# Patient Record
Sex: MALE | Race: WHITE | NOT HISPANIC OR LATINO | Employment: FULL TIME | URBAN - METROPOLITAN AREA
[De-identification: names, ages, dates, MRNs, and addresses within clinical notes are randomized per-mention and may not be internally consistent; named-entity substitution may affect disease eponyms.]

---

## 2023-08-19 ENCOUNTER — APPOINTMENT (OUTPATIENT)
Dept: CT IMAGING | Facility: HOSPITAL | Age: 23
DRG: 159 | End: 2023-08-19
Payer: COMMERCIAL

## 2023-08-19 ENCOUNTER — HOSPITAL ENCOUNTER (INPATIENT)
Facility: HOSPITAL | Age: 23
LOS: 3 days | Discharge: HOME | DRG: 159 | End: 2023-08-22
Attending: EMERGENCY MEDICINE | Admitting: HOSPITALIST
Payer: COMMERCIAL

## 2023-08-19 DIAGNOSIS — Z98.818 S/P WISDOM TOOTH EXTRACTION: ICD-10-CM

## 2023-08-19 DIAGNOSIS — R22.0 RIGHT FACIAL SWELLING: Primary | ICD-10-CM

## 2023-08-19 DIAGNOSIS — K12.2 SUBMANDIBULAR ABSCESS: ICD-10-CM

## 2023-08-19 PROBLEM — K13.79 ORAL PAIN: Status: ACTIVE | Noted: 2023-08-19

## 2023-08-19 LAB
ALBUMIN SERPL-MCNC: 4.4 G/DL (ref 3.5–5.2)
ALBUMIN/GLOB SERPL: 1.8 G/DL
ALP SERPL-CCNC: 79 U/L (ref 39–117)
ALT SERPL W P-5'-P-CCNC: 15 U/L (ref 1–41)
ANION GAP SERPL CALCULATED.3IONS-SCNC: 10 MMOL/L (ref 5–15)
AST SERPL-CCNC: 16 U/L (ref 1–40)
BASOPHILS # BLD AUTO: 0.01 10*3/MM3 (ref 0–0.2)
BASOPHILS NFR BLD AUTO: 0.1 % (ref 0–1.5)
BILIRUB SERPL-MCNC: 0.4 MG/DL (ref 0–1.2)
BUN SERPL-MCNC: 10 MG/DL (ref 6–20)
BUN/CREAT SERPL: 12 (ref 7–25)
CALCIUM SPEC-SCNC: 9.7 MG/DL (ref 8.6–10.5)
CHLORIDE SERPL-SCNC: 101 MMOL/L (ref 98–107)
CO2 SERPL-SCNC: 30 MMOL/L (ref 22–29)
CREAT SERPL-MCNC: 0.83 MG/DL (ref 0.76–1.27)
D-LACTATE SERPL-SCNC: 0.8 MMOL/L (ref 0.5–2)
DEPRECATED RDW RBC AUTO: 36.8 FL (ref 37–54)
EGFRCR SERPLBLD CKD-EPI 2021: 126.9 ML/MIN/1.73
EOSINOPHIL # BLD AUTO: 0.05 10*3/MM3 (ref 0–0.4)
EOSINOPHIL NFR BLD AUTO: 0.5 % (ref 0.3–6.2)
ERYTHROCYTE [DISTWIDTH] IN BLOOD BY AUTOMATED COUNT: 12.3 % (ref 12.3–15.4)
GLOBULIN UR ELPH-MCNC: 2.5 GM/DL
GLUCOSE SERPL-MCNC: 103 MG/DL (ref 65–99)
HCT VFR BLD AUTO: 42 % (ref 37.5–51)
HGB BLD-MCNC: 14.5 G/DL (ref 13–17.7)
IMM GRANULOCYTES # BLD AUTO: 0.01 10*3/MM3 (ref 0–0.05)
IMM GRANULOCYTES NFR BLD AUTO: 0.1 % (ref 0–0.5)
LYMPHOCYTES # BLD AUTO: 1.47 10*3/MM3 (ref 0.7–3.1)
LYMPHOCYTES NFR BLD AUTO: 15.9 % (ref 19.6–45.3)
MCH RBC QN AUTO: 28.4 PG (ref 26.6–33)
MCHC RBC AUTO-ENTMCNC: 34.5 G/DL (ref 31.5–35.7)
MCV RBC AUTO: 82.2 FL (ref 79–97)
MONOCYTES # BLD AUTO: 0.88 10*3/MM3 (ref 0.1–0.9)
MONOCYTES NFR BLD AUTO: 9.5 % (ref 5–12)
NEUTROPHILS NFR BLD AUTO: 6.8 10*3/MM3 (ref 1.7–7)
NEUTROPHILS NFR BLD AUTO: 73.9 % (ref 42.7–76)
NRBC BLD AUTO-RTO: 0 /100 WBC (ref 0–0.2)
PLATELET # BLD AUTO: 224 10*3/MM3 (ref 140–450)
PMV BLD AUTO: 9.7 FL (ref 6–12)
POTASSIUM SERPL-SCNC: 3.9 MMOL/L (ref 3.5–5.2)
PROT SERPL-MCNC: 6.9 G/DL (ref 6–8.5)
RBC # BLD AUTO: 5.11 10*6/MM3 (ref 4.14–5.8)
SODIUM SERPL-SCNC: 141 MMOL/L (ref 136–145)
WBC NRBC COR # BLD: 9.22 10*3/MM3 (ref 3.4–10.8)

## 2023-08-19 PROCEDURE — 99285 EMERGENCY DEPT VISIT HI MDM: CPT

## 2023-08-19 PROCEDURE — G0378 HOSPITAL OBSERVATION PER HR: HCPCS

## 2023-08-19 PROCEDURE — 36415 COLL VENOUS BLD VENIPUNCTURE: CPT

## 2023-08-19 PROCEDURE — 80053 COMPREHEN METABOLIC PANEL: CPT | Performed by: EMERGENCY MEDICINE

## 2023-08-19 PROCEDURE — 70491 CT SOFT TISSUE NECK W/DYE: CPT

## 2023-08-19 PROCEDURE — 25010000002 HYDROMORPHONE PER 4 MG: Performed by: EMERGENCY MEDICINE

## 2023-08-19 PROCEDURE — 87040 BLOOD CULTURE FOR BACTERIA: CPT | Performed by: EMERGENCY MEDICINE

## 2023-08-19 PROCEDURE — 83605 ASSAY OF LACTIC ACID: CPT | Performed by: EMERGENCY MEDICINE

## 2023-08-19 PROCEDURE — 25010000002 AMPICILLIN-SULBACTAM PER 1.5 G: Performed by: EMERGENCY MEDICINE

## 2023-08-19 PROCEDURE — 25010000002 MORPHINE PER 10 MG: Performed by: EMERGENCY MEDICINE

## 2023-08-19 PROCEDURE — 25010000002 ONDANSETRON PER 1 MG: Performed by: EMERGENCY MEDICINE

## 2023-08-19 PROCEDURE — 25510000001 IOPAMIDOL 61 % SOLUTION: Performed by: EMERGENCY MEDICINE

## 2023-08-19 PROCEDURE — 85025 COMPLETE CBC W/AUTO DIFF WBC: CPT | Performed by: EMERGENCY MEDICINE

## 2023-08-19 RX ORDER — ONDANSETRON 2 MG/ML
4 INJECTION INTRAMUSCULAR; INTRAVENOUS ONCE
Status: COMPLETED | OUTPATIENT
Start: 2023-08-19 | End: 2023-08-19

## 2023-08-19 RX ORDER — ONDANSETRON 2 MG/ML
4 INJECTION INTRAMUSCULAR; INTRAVENOUS EVERY 6 HOURS PRN
Status: DISCONTINUED | OUTPATIENT
Start: 2023-08-19 | End: 2023-08-22 | Stop reason: HOSPADM

## 2023-08-19 RX ORDER — SODIUM CHLORIDE 9 MG/ML
40 INJECTION, SOLUTION INTRAVENOUS AS NEEDED
Status: DISCONTINUED | OUTPATIENT
Start: 2023-08-19 | End: 2023-08-22 | Stop reason: HOSPADM

## 2023-08-19 RX ORDER — SODIUM CHLORIDE 0.9 % (FLUSH) 0.9 %
10 SYRINGE (ML) INJECTION AS NEEDED
Status: DISCONTINUED | OUTPATIENT
Start: 2023-08-19 | End: 2023-08-22 | Stop reason: HOSPADM

## 2023-08-19 RX ORDER — HYDROMORPHONE HYDROCHLORIDE 1 MG/ML
0.25 INJECTION, SOLUTION INTRAMUSCULAR; INTRAVENOUS; SUBCUTANEOUS
Status: DISCONTINUED | OUTPATIENT
Start: 2023-08-19 | End: 2023-08-20

## 2023-08-19 RX ORDER — POLYETHYLENE GLYCOL 3350 17 G/17G
17 POWDER, FOR SOLUTION ORAL DAILY PRN
Status: DISCONTINUED | OUTPATIENT
Start: 2023-08-19 | End: 2023-08-22 | Stop reason: HOSPADM

## 2023-08-19 RX ORDER — BISACODYL 5 MG/1
5 TABLET, DELAYED RELEASE ORAL DAILY PRN
Status: DISCONTINUED | OUTPATIENT
Start: 2023-08-19 | End: 2023-08-22 | Stop reason: HOSPADM

## 2023-08-19 RX ORDER — BISACODYL 10 MG
10 SUPPOSITORY, RECTAL RECTAL DAILY PRN
Status: DISCONTINUED | OUTPATIENT
Start: 2023-08-19 | End: 2023-08-22 | Stop reason: HOSPADM

## 2023-08-19 RX ORDER — HYDROMORPHONE HYDROCHLORIDE 1 MG/ML
0.25 INJECTION, SOLUTION INTRAMUSCULAR; INTRAVENOUS; SUBCUTANEOUS ONCE
Status: COMPLETED | OUTPATIENT
Start: 2023-08-19 | End: 2023-08-19

## 2023-08-19 RX ORDER — SODIUM CHLORIDE 0.9 % (FLUSH) 0.9 %
10 SYRINGE (ML) INJECTION EVERY 12 HOURS SCHEDULED
Status: DISCONTINUED | OUTPATIENT
Start: 2023-08-19 | End: 2023-08-22 | Stop reason: HOSPADM

## 2023-08-19 RX ORDER — MORPHINE SULFATE 2 MG/ML
2 INJECTION, SOLUTION INTRAMUSCULAR; INTRAVENOUS ONCE
Status: COMPLETED | OUTPATIENT
Start: 2023-08-19 | End: 2023-08-19

## 2023-08-19 RX ORDER — AMOXICILLIN 250 MG
2 CAPSULE ORAL 2 TIMES DAILY
Status: DISCONTINUED | OUTPATIENT
Start: 2023-08-19 | End: 2023-08-22 | Stop reason: HOSPADM

## 2023-08-19 RX ADMIN — MORPHINE SULFATE 2 MG: 2 INJECTION, SOLUTION INTRAMUSCULAR; INTRAVENOUS at 19:52

## 2023-08-19 RX ADMIN — HYDROMORPHONE HYDROCHLORIDE 0.25 MG: 1 INJECTION, SOLUTION INTRAMUSCULAR; INTRAVENOUS; SUBCUTANEOUS at 21:39

## 2023-08-19 RX ADMIN — IOPAMIDOL 75 ML: 612 INJECTION, SOLUTION INTRAVENOUS at 20:36

## 2023-08-19 RX ADMIN — AMPICILLIN SODIUM AND SULBACTAM SODIUM 3 G: 2; 1 INJECTION, POWDER, FOR SOLUTION INTRAMUSCULAR; INTRAVENOUS at 20:11

## 2023-08-19 RX ADMIN — ONDANSETRON 4 MG: 2 INJECTION INTRAMUSCULAR; INTRAVENOUS at 19:52

## 2023-08-19 NOTE — ED PROVIDER NOTES
MD ATTESTATION NOTE    The LESTER and I have discussed this patient's history, physical exam, and treatment plan.  I have reviewed the documentation and personally had a face to face interaction with the patient. I affirm the documentation and agree with the treatment and plan.  The attached note describes my personal findings.    I provided a substantive portion of the care of this patient. I personally performed the physical exam, in its entirety.    Independent Historians: Patient, family    A complete HPI/ROS/PMH/PSH/SH/FH are unobtainable due to: Nothing    Chronic or social conditions impacting patient care (social determinants of health): None    Willian Pires is a 22 y.o. male who presents to the ED c/o acute face and jaw swelling.  The patient reports that on Tuesday he had some wisdom teeth removed.  He states that he was doing well until Friday.  He reports he talked to his oral surgeon yesterday and they added metronidazole to the penicillin he was already on.  They refilled his pain medicine.  He reports he woke up today with further swelling under his right jaw as well as pain with swallowing and some feeling of swelling under his tongue.  He reports he called his surgeon and they directed him to the emergency room.  He denies any fever.  He does report that he has had some sweats.      Review of prior external notes (non-ED) -and- Review of prior external test results outside of this encounter: Hemoglobin A1c was 5.5 on 7/11/2022    Prescription drug monitoring program review:        On exam:  GENERAL: Awake, alert, no acute distress  SKIN: Warm, dry  HENT: Normocephalic, atraumatic.  Swelling under the right jaw.  This is firm.  There is no stridor or drooling.  He does have somewhat of a muffled voice.  He has mild trismus.  He has no abnormal tongue elevation.  EYES: no scleral icterus  CV: regular rhythm, regular rate  RESPIRATORY: normal effort, lungs clear  ABDOMEN: soft, nontender,  nondistended  MUSCULOSKELETAL: no deformity  NEURO: alert, moves all extremities, follows commands                                                             Labs  Recent Results (from the past 24 hour(s))   Comprehensive Metabolic Panel    Collection Time: 08/19/23  7:51 PM    Specimen: Blood   Result Value Ref Range    Glucose 103 (H) 65 - 99 mg/dL    BUN 10 6 - 20 mg/dL    Creatinine 0.83 0.76 - 1.27 mg/dL    Sodium 141 136 - 145 mmol/L    Potassium 3.9 3.5 - 5.2 mmol/L    Chloride 101 98 - 107 mmol/L    CO2 30.0 (H) 22.0 - 29.0 mmol/L    Calcium 9.7 8.6 - 10.5 mg/dL    Total Protein 6.9 6.0 - 8.5 g/dL    Albumin 4.4 3.5 - 5.2 g/dL    ALT (SGPT) 15 1 - 41 U/L    AST (SGOT) 16 1 - 40 U/L    Alkaline Phosphatase 79 39 - 117 U/L    Total Bilirubin 0.4 0.0 - 1.2 mg/dL    Globulin 2.5 gm/dL    A/G Ratio 1.8 g/dL    BUN/Creatinine Ratio 12.0 7.0 - 25.0    Anion Gap 10.0 5.0 - 15.0 mmol/L    eGFR 126.9 >60.0 mL/min/1.73   CBC Auto Differential    Collection Time: 08/19/23  7:51 PM    Specimen: Blood   Result Value Ref Range    WBC 9.22 3.40 - 10.80 10*3/mm3    RBC 5.11 4.14 - 5.80 10*6/mm3    Hemoglobin 14.5 13.0 - 17.7 g/dL    Hematocrit 42.0 37.5 - 51.0 %    MCV 82.2 79.0 - 97.0 fL    MCH 28.4 26.6 - 33.0 pg    MCHC 34.5 31.5 - 35.7 g/dL    RDW 12.3 12.3 - 15.4 %    RDW-SD 36.8 (L) 37.0 - 54.0 fl    MPV 9.7 6.0 - 12.0 fL    Platelets 224 140 - 450 10*3/mm3    Neutrophil % 73.9 42.7 - 76.0 %    Lymphocyte % 15.9 (L) 19.6 - 45.3 %    Monocyte % 9.5 5.0 - 12.0 %    Eosinophil % 0.5 0.3 - 6.2 %    Basophil % 0.1 0.0 - 1.5 %    Immature Grans % 0.1 0.0 - 0.5 %    Neutrophils, Absolute 6.80 1.70 - 7.00 10*3/mm3    Lymphocytes, Absolute 1.47 0.70 - 3.10 10*3/mm3    Monocytes, Absolute 0.88 0.10 - 0.90 10*3/mm3    Eosinophils, Absolute 0.05 0.00 - 0.40 10*3/mm3    Basophils, Absolute 0.01 0.00 - 0.20 10*3/mm3    Immature Grans, Absolute 0.01 0.00 - 0.05 10*3/mm3    nRBC 0.0 0.0 - 0.2 /100 WBC   Lactic Acid, Plasma     Collection Time: 08/19/23  7:51 PM    Specimen: Blood   Result Value Ref Range    Lactate 0.8 0.5 - 2.0 mmol/L       Radiology  CT Soft Tissue Neck With Contrast    Result Date: 8/19/2023  CT NECK SOFT TISSUE WITH CONTRAST  HISTORY: Postoperative swelling under the right jaw  COMPARISON: None available.  TECHNIQUE: Axial CT imaging was obtained through the neck. IV contrast was administered. Coronal and sagittal reformatted images were obtained.  FINDINGS: Visualized portions of the brain parenchyma appear unremarkable. There is no evidence of venous occlusion. Orbits appear unremarkable. Mucous retention cyst is noted within the right ethmoid sinus. Mastoid air cells are clear. Patient does appear to be status post bilateral wisdom tooth extraction. There is some asymmetric soft tissue stranding seen overlying the right mandible. No discrete drainable fluid collection is seen. The nasopharynx is normal. Right parapharyngeal soft tissues may be slightly thickened when compared to the contralateral side. There is some asymmetric enlargement of the right submandibular gland. Right mylohyoid muscle also appears thickened and edematous when compared to the contralateral side. Epiglottis appears normal. Vocal cords also appear normal. There is some subtle asymmetric enlargement of the inferior right sternocleidomastoid, with some surrounding edema. Thyroid gland and trachea are unremarkable. Images through the lung bases are clear. Prominent right cervical lymph nodes are likely reactive. There is no prevertebral edema.       Patient is status post bilateral wisdom tooth extraction. There is asymmetric inflammatory stranding seen overlying the right side of the mandible, as well as asymmetric edema and enlargement of the floor of the mouth. Right submandibular gland also appears enlarged when compared to the contralateral side. No discrete drainable abscess is seen. There is some fullness within the right parapharyngeal  space, but the airway appears intact.  Radiation dose reduction techniques were utilized, including automated exposure control and exposure modulation based on body size.   This report was finalized on 8/19/2023 9:27 PM by Dr. Saniya Carrillo M.D.       Medical Decision Making:  ED Course as of 08/19/23 2314   Sat Aug 19, 2023   1914 Oral surgeon: Stacey [TR]   2041 CT Soft Tissue Neck With Contrast  My independent interpretation of the CT of the neck with contrast is right submental swelling without focal fluid collection [TR]   2046 WBC: 9.22 [OSMAR]   2046 Hemoglobin: 14.5 [OSMAR]   2046 Hematocrit: 42.0 [OSMAR]   2046 Platelets: 224 [OSMAR]   2046 Glucose(!): 103 [OSMAR]   2046 BUN: 10 [OSMAR]   2046 Creatinine: 0.83 [OSMAR]   2046 Sodium: 141 [OSMAR]   2046 Potassium: 3.9 [OSMAR]   2046 Chloride: 101 [OSMAR]   2046 CO2(!): 30.0 [OSMAR]   2046 Lactate: 0.8 [OSMAR]   2202 Patient history, ER presentation as well as laboratory and CT findings discussed with Dr. Jiménez, oral surgery, will consult, requested medicine to admit. [OSMAR]   2210 Patient history, ER presentation and evaluation as well as discussions with oral maxillofacial surgery discussed with HALEY Tong, will place in observation to a [facility] bed per Dr. Wang. [OSMAR]   2219 Patient rechecked, discussed plan for admission for oral surgery consultation and continued antibiotics.  Patient expresses understanding and agrees with plan. [OSMAR]      ED Course User Index  [OSMAR] Christian Mosley PA  [TR] Mor Snider MD       The patient presents with focal swelling after jaw surgery.  Plan to obtain imaging of his neck looking for abscess or progressive infection.  I will give him morphine for pain.  I will give him Unasyn IV in the meantime.  My differential diagnosis includes jaw abscess, hematoma, Ludewig's angina, deep space neck infection, and others.    Procedures:  Procedures            Diagnosis  Final diagnoses:   Right facial swelling   S/P wisdom tooth extraction       Note  Disclaimer: At [facility], we believe that sharing information builds trust and better relationships. You are receiving this note because you recently visited [facility]. It is possible you will see health information before a provider has talked with you about it. This kind of information can be easy to misunderstand. To help you fully understand what it means for your health, we urge you to discuss this note with your provider.       Mor Snider MD  08/19/23 6148       Mor Snider MD  08/19/23 2094

## 2023-08-19 NOTE — ED TRIAGE NOTES
Patient from home via PV reporting oral swelling that started this morning. Patient had wisdom teeth extracted on 8/15. Patient's voice is muffled, denies difficulty breathing. Patient states he is able to swallow secretions.

## 2023-08-20 ENCOUNTER — ANESTHESIA (OUTPATIENT)
Dept: PERIOP | Facility: HOSPITAL | Age: 23
DRG: 159 | End: 2023-08-20
Payer: COMMERCIAL

## 2023-08-20 ENCOUNTER — ANESTHESIA EVENT (OUTPATIENT)
Dept: PERIOP | Facility: HOSPITAL | Age: 23
DRG: 159 | End: 2023-08-20
Payer: COMMERCIAL

## 2023-08-20 LAB
ANION GAP SERPL CALCULATED.3IONS-SCNC: 11 MMOL/L (ref 5–15)
BUN SERPL-MCNC: 9 MG/DL (ref 6–20)
BUN/CREAT SERPL: 12.2 (ref 7–25)
CALCIUM SPEC-SCNC: 9.2 MG/DL (ref 8.6–10.5)
CHLORIDE SERPL-SCNC: 102 MMOL/L (ref 98–107)
CO2 SERPL-SCNC: 26 MMOL/L (ref 22–29)
CREAT SERPL-MCNC: 0.74 MG/DL (ref 0.76–1.27)
DEPRECATED RDW RBC AUTO: 36.5 FL (ref 37–54)
EGFRCR SERPLBLD CKD-EPI 2021: 131.4 ML/MIN/1.73
ERYTHROCYTE [DISTWIDTH] IN BLOOD BY AUTOMATED COUNT: 12.4 % (ref 12.3–15.4)
GLUCOSE SERPL-MCNC: 102 MG/DL (ref 65–99)
HCT VFR BLD AUTO: 41.1 % (ref 37.5–51)
HGB BLD-MCNC: 14.3 G/DL (ref 13–17.7)
MCH RBC QN AUTO: 28.4 PG (ref 26.6–33)
MCHC RBC AUTO-ENTMCNC: 34.8 G/DL (ref 31.5–35.7)
MCV RBC AUTO: 81.7 FL (ref 79–97)
PLATELET # BLD AUTO: 214 10*3/MM3 (ref 140–450)
PMV BLD AUTO: 9.8 FL (ref 6–12)
POTASSIUM SERPL-SCNC: 4.3 MMOL/L (ref 3.5–5.2)
RBC # BLD AUTO: 5.03 10*6/MM3 (ref 4.14–5.8)
SODIUM SERPL-SCNC: 139 MMOL/L (ref 136–145)
WBC NRBC COR # BLD: 10.88 10*3/MM3 (ref 3.4–10.8)

## 2023-08-20 PROCEDURE — 80048 BASIC METABOLIC PNL TOTAL CA: CPT | Performed by: NURSE PRACTITIONER

## 2023-08-20 PROCEDURE — G0378 HOSPITAL OBSERVATION PER HR: HCPCS

## 2023-08-20 PROCEDURE — 25010000002 KETOROLAC TROMETHAMINE PER 15 MG: Performed by: DENTIST

## 2023-08-20 PROCEDURE — 25010000002 ONDANSETRON PER 1 MG: Performed by: ANESTHESIOLOGY

## 2023-08-20 PROCEDURE — 25010000002 PROPOFOL 10 MG/ML EMULSION: Performed by: ANESTHESIOLOGY

## 2023-08-20 PROCEDURE — 25010000002 MORPHINE PER 10 MG: Performed by: DENTIST

## 2023-08-20 PROCEDURE — 0C9X0Z0 DRAINAGE OF LOWER TOOTH, OPEN APPROACH, SINGLE: ICD-10-PCS | Performed by: DENTIST

## 2023-08-20 PROCEDURE — 87205 SMEAR GRAM STAIN: CPT | Performed by: DENTIST

## 2023-08-20 PROCEDURE — 87077 CULTURE AEROBIC IDENTIFY: CPT | Performed by: DENTIST

## 2023-08-20 PROCEDURE — 87075 CULTR BACTERIA EXCEPT BLOOD: CPT | Performed by: DENTIST

## 2023-08-20 PROCEDURE — 87015 SPECIMEN INFECT AGNT CONCNTJ: CPT | Performed by: DENTIST

## 2023-08-20 PROCEDURE — 25010000002 ONDANSETRON PER 1 MG: Performed by: DENTIST

## 2023-08-20 PROCEDURE — 25010000002 FENTANYL CITRATE (PF) 50 MCG/ML SOLUTION: Performed by: ANESTHESIOLOGY

## 2023-08-20 PROCEDURE — 36415 COLL VENOUS BLD VENIPUNCTURE: CPT | Performed by: NURSE PRACTITIONER

## 2023-08-20 PROCEDURE — 25010000002 KETOROLAC TROMETHAMINE PER 15 MG: Performed by: STUDENT IN AN ORGANIZED HEALTH CARE EDUCATION/TRAINING PROGRAM

## 2023-08-20 PROCEDURE — 25010000002 MIDAZOLAM PER 1 MG: Performed by: ANESTHESIOLOGY

## 2023-08-20 PROCEDURE — 25010000002 AMPICILLIN-SULBACTAM PER 1.5 G: Performed by: DENTIST

## 2023-08-20 PROCEDURE — 87186 SC STD MICRODIL/AGAR DIL: CPT | Performed by: DENTIST

## 2023-08-20 PROCEDURE — 25010000002 HYDROMORPHONE PER 4 MG: Performed by: ANESTHESIOLOGY

## 2023-08-20 PROCEDURE — 0CTX0Z0 RESECTION OF LOWER TOOTH, SINGLE, OPEN APPROACH: ICD-10-PCS | Performed by: DENTIST

## 2023-08-20 PROCEDURE — 25010000002 HYDROMORPHONE PER 4 MG: Performed by: NURSE PRACTITIONER

## 2023-08-20 PROCEDURE — 25010000002 AMPICILLIN-SULBACTAM PER 1.5 G: Performed by: NURSE PRACTITIONER

## 2023-08-20 PROCEDURE — 25010000002 ONDANSETRON PER 1 MG: Performed by: NURSE PRACTITIONER

## 2023-08-20 PROCEDURE — 87076 CULTURE ANAEROBE IDENT EACH: CPT | Performed by: DENTIST

## 2023-08-20 PROCEDURE — 87070 CULTURE OTHR SPECIMN AEROBIC: CPT | Performed by: DENTIST

## 2023-08-20 PROCEDURE — 85027 COMPLETE CBC AUTOMATED: CPT | Performed by: NURSE PRACTITIONER

## 2023-08-20 PROCEDURE — 25010000002 HYDROMORPHONE PER 4 MG: Performed by: STUDENT IN AN ORGANIZED HEALTH CARE EDUCATION/TRAINING PROGRAM

## 2023-08-20 RX ORDER — PROMETHAZINE HYDROCHLORIDE 25 MG/1
25 TABLET ORAL ONCE AS NEEDED
Status: DISCONTINUED | OUTPATIENT
Start: 2023-08-20 | End: 2023-08-20 | Stop reason: HOSPADM

## 2023-08-20 RX ORDER — OXYCODONE AND ACETAMINOPHEN 7.5; 325 MG/1; MG/1
1 TABLET ORAL EVERY 4 HOURS PRN
Status: DISCONTINUED | OUTPATIENT
Start: 2023-08-20 | End: 2023-08-20 | Stop reason: HOSPADM

## 2023-08-20 RX ORDER — MIDAZOLAM HYDROCHLORIDE 1 MG/ML
1 INJECTION INTRAMUSCULAR; INTRAVENOUS
Status: COMPLETED | OUTPATIENT
Start: 2023-08-20 | End: 2023-08-20

## 2023-08-20 RX ORDER — ONDANSETRON 2 MG/ML
INJECTION INTRAMUSCULAR; INTRAVENOUS AS NEEDED
Status: DISCONTINUED | OUTPATIENT
Start: 2023-08-20 | End: 2023-08-20 | Stop reason: SURG

## 2023-08-20 RX ORDER — FLUMAZENIL 0.1 MG/ML
0.2 INJECTION INTRAVENOUS AS NEEDED
Status: DISCONTINUED | OUTPATIENT
Start: 2023-08-20 | End: 2023-08-20 | Stop reason: HOSPADM

## 2023-08-20 RX ORDER — HYDRALAZINE HYDROCHLORIDE 20 MG/ML
5 INJECTION INTRAMUSCULAR; INTRAVENOUS
Status: DISCONTINUED | OUTPATIENT
Start: 2023-08-20 | End: 2023-08-20 | Stop reason: HOSPADM

## 2023-08-20 RX ORDER — MAGNESIUM HYDROXIDE 1200 MG/15ML
LIQUID ORAL AS NEEDED
Status: DISCONTINUED | OUTPATIENT
Start: 2023-08-20 | End: 2023-08-20 | Stop reason: HOSPADM

## 2023-08-20 RX ORDER — SODIUM CHLORIDE 9 MG/ML
75 INJECTION, SOLUTION INTRAVENOUS CONTINUOUS
Status: ACTIVE | OUTPATIENT
Start: 2023-08-20 | End: 2023-08-21

## 2023-08-20 RX ORDER — LIDOCAINE HYDROCHLORIDE 10 MG/ML
0.5 INJECTION, SOLUTION INFILTRATION; PERINEURAL ONCE AS NEEDED
Status: DISCONTINUED | OUTPATIENT
Start: 2023-08-20 | End: 2023-08-20 | Stop reason: HOSPADM

## 2023-08-20 RX ORDER — SODIUM CHLORIDE 0.9 % (FLUSH) 0.9 %
3-10 SYRINGE (ML) INJECTION AS NEEDED
Status: DISCONTINUED | OUTPATIENT
Start: 2023-08-20 | End: 2023-08-20 | Stop reason: HOSPADM

## 2023-08-20 RX ORDER — NALOXONE HCL 0.4 MG/ML
0.2 VIAL (ML) INJECTION AS NEEDED
Status: DISCONTINUED | OUTPATIENT
Start: 2023-08-20 | End: 2023-08-20 | Stop reason: HOSPADM

## 2023-08-20 RX ORDER — EPHEDRINE SULFATE 50 MG/ML
5 INJECTION, SOLUTION INTRAVENOUS ONCE AS NEEDED
Status: DISCONTINUED | OUTPATIENT
Start: 2023-08-20 | End: 2023-08-20 | Stop reason: HOSPADM

## 2023-08-20 RX ORDER — HYDROMORPHONE HYDROCHLORIDE 1 MG/ML
0.5 INJECTION, SOLUTION INTRAMUSCULAR; INTRAVENOUS; SUBCUTANEOUS
Status: DISCONTINUED | OUTPATIENT
Start: 2023-08-20 | End: 2023-08-20 | Stop reason: HOSPADM

## 2023-08-20 RX ORDER — DROPERIDOL 2.5 MG/ML
0.62 INJECTION, SOLUTION INTRAMUSCULAR; INTRAVENOUS
Status: DISCONTINUED | OUTPATIENT
Start: 2023-08-20 | End: 2023-08-20 | Stop reason: HOSPADM

## 2023-08-20 RX ORDER — PANTOPRAZOLE SODIUM 40 MG/10ML
40 INJECTION, POWDER, LYOPHILIZED, FOR SOLUTION INTRAVENOUS
Status: DISCONTINUED | OUTPATIENT
Start: 2023-08-20 | End: 2023-08-22 | Stop reason: HOSPADM

## 2023-08-20 RX ORDER — SODIUM CHLORIDE 0.9 % (FLUSH) 0.9 %
3 SYRINGE (ML) INJECTION EVERY 12 HOURS SCHEDULED
Status: DISCONTINUED | OUTPATIENT
Start: 2023-08-20 | End: 2023-08-20 | Stop reason: HOSPADM

## 2023-08-20 RX ORDER — KETOROLAC TROMETHAMINE 30 MG/ML
30 INJECTION, SOLUTION INTRAMUSCULAR; INTRAVENOUS ONCE
Status: COMPLETED | OUTPATIENT
Start: 2023-08-20 | End: 2023-08-20

## 2023-08-20 RX ORDER — FENTANYL CITRATE 50 UG/ML
50 INJECTION, SOLUTION INTRAMUSCULAR; INTRAVENOUS
Status: DISCONTINUED | OUTPATIENT
Start: 2023-08-20 | End: 2023-08-20 | Stop reason: HOSPADM

## 2023-08-20 RX ORDER — SUCCINYLCHOLINE/SOD CL,ISO/PF 200MG/10ML
SYRINGE (ML) INTRAVENOUS AS NEEDED
Status: DISCONTINUED | OUTPATIENT
Start: 2023-08-20 | End: 2023-08-20 | Stop reason: SURG

## 2023-08-20 RX ORDER — FENTANYL CITRATE 50 UG/ML
50 INJECTION, SOLUTION INTRAMUSCULAR; INTRAVENOUS ONCE AS NEEDED
Status: DISCONTINUED | OUTPATIENT
Start: 2023-08-20 | End: 2023-08-20 | Stop reason: HOSPADM

## 2023-08-20 RX ORDER — KETOROLAC TROMETHAMINE 30 MG/ML
30 INJECTION, SOLUTION INTRAMUSCULAR; INTRAVENOUS EVERY 6 HOURS PRN
Status: DISCONTINUED | OUTPATIENT
Start: 2023-08-20 | End: 2023-08-22 | Stop reason: HOSPADM

## 2023-08-20 RX ORDER — HYDROCODONE BITARTRATE AND ACETAMINOPHEN 7.5; 325 MG/1; MG/1
1 TABLET ORAL ONCE AS NEEDED
Status: DISCONTINUED | OUTPATIENT
Start: 2023-08-20 | End: 2023-08-20 | Stop reason: HOSPADM

## 2023-08-20 RX ORDER — MORPHINE SULFATE 2 MG/ML
2 INJECTION, SOLUTION INTRAMUSCULAR; INTRAVENOUS EVERY 4 HOURS PRN
Status: DISCONTINUED | OUTPATIENT
Start: 2023-08-20 | End: 2023-08-21

## 2023-08-20 RX ORDER — SODIUM CHLORIDE, SODIUM LACTATE, POTASSIUM CHLORIDE, CALCIUM CHLORIDE 600; 310; 30; 20 MG/100ML; MG/100ML; MG/100ML; MG/100ML
9 INJECTION, SOLUTION INTRAVENOUS CONTINUOUS
Status: DISCONTINUED | OUTPATIENT
Start: 2023-08-20 | End: 2023-08-22 | Stop reason: HOSPADM

## 2023-08-20 RX ORDER — HYDROMORPHONE HYDROCHLORIDE 1 MG/ML
0.5 INJECTION, SOLUTION INTRAMUSCULAR; INTRAVENOUS; SUBCUTANEOUS
Status: COMPLETED | OUTPATIENT
Start: 2023-08-20 | End: 2023-08-20

## 2023-08-20 RX ORDER — PROPOFOL 10 MG/ML
VIAL (ML) INTRAVENOUS AS NEEDED
Status: DISCONTINUED | OUTPATIENT
Start: 2023-08-20 | End: 2023-08-20 | Stop reason: SURG

## 2023-08-20 RX ORDER — IPRATROPIUM BROMIDE AND ALBUTEROL SULFATE 2.5; .5 MG/3ML; MG/3ML
3 SOLUTION RESPIRATORY (INHALATION) ONCE AS NEEDED
Status: DISCONTINUED | OUTPATIENT
Start: 2023-08-20 | End: 2023-08-20 | Stop reason: HOSPADM

## 2023-08-20 RX ORDER — LIDOCAINE HYDROCHLORIDE 20 MG/ML
INJECTION, SOLUTION INFILTRATION; PERINEURAL AS NEEDED
Status: DISCONTINUED | OUTPATIENT
Start: 2023-08-20 | End: 2023-08-20 | Stop reason: SURG

## 2023-08-20 RX ORDER — FENTANYL CITRATE 50 UG/ML
INJECTION, SOLUTION INTRAMUSCULAR; INTRAVENOUS AS NEEDED
Status: DISCONTINUED | OUTPATIENT
Start: 2023-08-20 | End: 2023-08-20 | Stop reason: SURG

## 2023-08-20 RX ORDER — DIPHENHYDRAMINE HYDROCHLORIDE 50 MG/ML
12.5 INJECTION INTRAMUSCULAR; INTRAVENOUS
Status: DISCONTINUED | OUTPATIENT
Start: 2023-08-20 | End: 2023-08-20 | Stop reason: HOSPADM

## 2023-08-20 RX ORDER — ONDANSETRON 2 MG/ML
4 INJECTION INTRAMUSCULAR; INTRAVENOUS ONCE AS NEEDED
Status: COMPLETED | OUTPATIENT
Start: 2023-08-20 | End: 2023-08-20

## 2023-08-20 RX ORDER — LIDOCAINE HYDROCHLORIDE AND EPINEPHRINE 10; 10 MG/ML; UG/ML
INJECTION, SOLUTION INFILTRATION; PERINEURAL AS NEEDED
Status: DISCONTINUED | OUTPATIENT
Start: 2023-08-20 | End: 2023-08-20 | Stop reason: HOSPADM

## 2023-08-20 RX ORDER — LABETALOL HYDROCHLORIDE 5 MG/ML
5 INJECTION, SOLUTION INTRAVENOUS
Status: DISCONTINUED | OUTPATIENT
Start: 2023-08-20 | End: 2023-08-20 | Stop reason: HOSPADM

## 2023-08-20 RX ORDER — PROMETHAZINE HYDROCHLORIDE 25 MG/1
25 SUPPOSITORY RECTAL ONCE AS NEEDED
Status: DISCONTINUED | OUTPATIENT
Start: 2023-08-20 | End: 2023-08-20 | Stop reason: HOSPADM

## 2023-08-20 RX ORDER — FAMOTIDINE 10 MG/ML
20 INJECTION, SOLUTION INTRAVENOUS ONCE
Status: DISCONTINUED | OUTPATIENT
Start: 2023-08-20 | End: 2023-08-20 | Stop reason: HOSPADM

## 2023-08-20 RX ADMIN — HYDROMORPHONE HYDROCHLORIDE 0.5 MG: 1 INJECTION, SOLUTION INTRAMUSCULAR; INTRAVENOUS; SUBCUTANEOUS at 08:52

## 2023-08-20 RX ADMIN — SENNOSIDES AND DOCUSATE SODIUM 2 TABLET: 50; 8.6 TABLET ORAL at 00:01

## 2023-08-20 RX ADMIN — Medication 120 MG: at 12:51

## 2023-08-20 RX ADMIN — MIDAZOLAM 1 MG: 1 INJECTION INTRAMUSCULAR; INTRAVENOUS at 12:14

## 2023-08-20 RX ADMIN — ONDANSETRON 4 MG: 2 INJECTION INTRAMUSCULAR; INTRAVENOUS at 13:54

## 2023-08-20 RX ADMIN — SODIUM CHLORIDE, POTASSIUM CHLORIDE, SODIUM LACTATE AND CALCIUM CHLORIDE: 600; 310; 30; 20 INJECTION, SOLUTION INTRAVENOUS at 12:47

## 2023-08-20 RX ADMIN — ONDANSETRON 4 MG: 2 INJECTION INTRAMUSCULAR; INTRAVENOUS at 13:03

## 2023-08-20 RX ADMIN — Medication 10 ML: at 00:00

## 2023-08-20 RX ADMIN — FENTANYL CITRATE 50 MCG: 50 INJECTION, SOLUTION INTRAMUSCULAR; INTRAVENOUS at 13:04

## 2023-08-20 RX ADMIN — HYDROMORPHONE HYDROCHLORIDE 0.25 MG: 1 INJECTION, SOLUTION INTRAMUSCULAR; INTRAVENOUS; SUBCUTANEOUS at 00:00

## 2023-08-20 RX ADMIN — Medication 10 ML: at 20:24

## 2023-08-20 RX ADMIN — FENTANYL CITRATE 50 MCG: 50 INJECTION, SOLUTION INTRAMUSCULAR; INTRAVENOUS at 14:07

## 2023-08-20 RX ADMIN — PROPOFOL 200 MG: 10 INJECTION, EMULSION INTRAVENOUS at 12:51

## 2023-08-20 RX ADMIN — AMPICILLIN SODIUM AND SULBACTAM SODIUM 3 G: 2; 1 INJECTION, POWDER, FOR SOLUTION INTRAMUSCULAR; INTRAVENOUS at 20:25

## 2023-08-20 RX ADMIN — MORPHINE SULFATE 2 MG: 2 INJECTION, SOLUTION INTRAMUSCULAR; INTRAVENOUS at 15:05

## 2023-08-20 RX ADMIN — HYDROMORPHONE HYDROCHLORIDE 0.25 MG: 1 INJECTION, SOLUTION INTRAMUSCULAR; INTRAVENOUS; SUBCUTANEOUS at 05:51

## 2023-08-20 RX ADMIN — KETOROLAC TROMETHAMINE 30 MG: 30 INJECTION, SOLUTION INTRAMUSCULAR; INTRAVENOUS at 18:06

## 2023-08-20 RX ADMIN — FENTANYL CITRATE 50 MCG: 50 INJECTION, SOLUTION INTRAMUSCULAR; INTRAVENOUS at 13:52

## 2023-08-20 RX ADMIN — SODIUM CHLORIDE 75 ML/HR: 9 INJECTION, SOLUTION INTRAVENOUS at 07:48

## 2023-08-20 RX ADMIN — HYDROMORPHONE HYDROCHLORIDE 0.5 MG: 1 INJECTION, SOLUTION INTRAMUSCULAR; INTRAVENOUS; SUBCUTANEOUS at 13:56

## 2023-08-20 RX ADMIN — MORPHINE SULFATE 2 MG: 2 INJECTION, SOLUTION INTRAMUSCULAR; INTRAVENOUS at 23:39

## 2023-08-20 RX ADMIN — AMPICILLIN SODIUM AND SULBACTAM SODIUM 3 G: 2; 1 INJECTION, POWDER, FOR SOLUTION INTRAMUSCULAR; INTRAVENOUS at 08:44

## 2023-08-20 RX ADMIN — LIDOCAINE HYDROCHLORIDE 100 MG: 20 INJECTION, SOLUTION INFILTRATION; PERINEURAL at 12:50

## 2023-08-20 RX ADMIN — ONDANSETRON 4 MG: 2 INJECTION INTRAMUSCULAR; INTRAVENOUS at 15:05

## 2023-08-20 RX ADMIN — KETOROLAC TROMETHAMINE 30 MG: 30 INJECTION, SOLUTION INTRAMUSCULAR; INTRAVENOUS at 07:45

## 2023-08-20 RX ADMIN — FENTANYL CITRATE 50 MCG: 50 INJECTION, SOLUTION INTRAMUSCULAR; INTRAVENOUS at 13:59

## 2023-08-20 RX ADMIN — AMPICILLIN SODIUM AND SULBACTAM SODIUM 3 G: 2; 1 INJECTION, POWDER, FOR SOLUTION INTRAMUSCULAR; INTRAVENOUS at 14:59

## 2023-08-20 RX ADMIN — MORPHINE SULFATE 2 MG: 2 INJECTION, SOLUTION INTRAMUSCULAR; INTRAVENOUS at 19:25

## 2023-08-20 RX ADMIN — PANTOPRAZOLE SODIUM 40 MG: 40 INJECTION, POWDER, FOR SOLUTION INTRAVENOUS at 08:44

## 2023-08-20 RX ADMIN — ONDANSETRON 4 MG: 2 INJECTION INTRAMUSCULAR; INTRAVENOUS at 10:14

## 2023-08-20 RX ADMIN — MIDAZOLAM 1 MG: 1 INJECTION INTRAMUSCULAR; INTRAVENOUS at 12:40

## 2023-08-20 RX ADMIN — HYDROMORPHONE HYDROCHLORIDE 0.25 MG: 1 INJECTION, SOLUTION INTRAMUSCULAR; INTRAVENOUS; SUBCUTANEOUS at 03:07

## 2023-08-20 NOTE — ED PROVIDER NOTES
EMERGENCY DEPARTMENT ENCOUNTER    Room Number:  04/04  Date of encounter:  8/19/2023  PCP: Provider, No Known  Patient Care Team:  Provider, No Known as PCP - General   Independent Historians: Patient    HPI:  Chief Complaint: Facial pain  A complete HPI/ROS/PMH/PSH/SH/FH are unobtainable due to: N/A    Chronic or social conditions impacting patient care (social determinants of health): None    Context: Willian Pires is a 22 y.o. male with no significant past medical history who arrives to the ED with complaint of right-sided facial pain and swelling.  Patient states that he had his wisdom teeth removed on Tuesday and then on Friday he started to develop worsening pain and swelling to the right side of his jaw that was spreading below his mandible.  Patient states that he has had some muffled voice, difficulty opening his mouth, and the pain is worse with swallowing.  Denies any fever or chills, chest pain, or shortness of breath.    Review of prior external notes (non-ED): None    Review of prior external test results outside of this encounter: None    PAST MEDICAL HISTORY  Active Ambulatory Problems     Diagnosis Date Noted    No Active Ambulatory Problems     Resolved Ambulatory Problems     Diagnosis Date Noted    No Resolved Ambulatory Problems     No Additional Past Medical History       The patient has started, but not completed, their COVID-19 vaccination series.    PAST SURGICAL HISTORY  No past surgical history on file.      FAMILY HISTORY  History reviewed. No pertinent family history.      SOCIAL HISTORY  Social History     Socioeconomic History    Marital status: Single         ALLERGIES  Patient has no known allergies.        REVIEW OF SYSTEMS  Review of Systems     All systems reviewed and negative except for those discussed in HPI.       PHYSICAL EXAM    I have reviewed the triage vital signs and nursing notes.    ED Triage Vitals [08/19/23 1907]   Temp Heart Rate Resp BP SpO2   97.9 øF (36.6 øC) 104  18 137/81 96 %      Temp src Heart Rate Source Patient Position BP Location FiO2 (%)   -- -- -- -- --       Physical Exam    GENERAL: alert and oriented x4, mildly distressed  HENT: normocephalic, atraumatic, moist mucous membranes, mild trismus, tense soft tissue swelling to the angle of the right mandible and inferior to the right mandible, no induration to the floor of the mouth  EYES: no scleral icterus, PERRL, EOMI  CV: regular rhythm, regular rate, intact distal perfusion  RESPIRATORY: normal effort, CTAB  MUSCULOSKELETAL: no deformity  NEURO: alert, moves all extremities, no focal neuro deficits, follows commands  SKIN: warm, dry, no rash   Psych: Appropriate mood and affect      Nursing notes and vital signs reviewed      LAB RESULTS  Recent Results (from the past 24 hour(s))   Comprehensive Metabolic Panel    Collection Time: 08/19/23  7:51 PM    Specimen: Blood   Result Value Ref Range    Glucose 103 (H) 65 - 99 mg/dL    BUN 10 6 - 20 mg/dL    Creatinine 0.83 0.76 - 1.27 mg/dL    Sodium 141 136 - 145 mmol/L    Potassium 3.9 3.5 - 5.2 mmol/L    Chloride 101 98 - 107 mmol/L    CO2 30.0 (H) 22.0 - 29.0 mmol/L    Calcium 9.7 8.6 - 10.5 mg/dL    Total Protein 6.9 6.0 - 8.5 g/dL    Albumin 4.4 3.5 - 5.2 g/dL    ALT (SGPT) 15 1 - 41 U/L    AST (SGOT) 16 1 - 40 U/L    Alkaline Phosphatase 79 39 - 117 U/L    Total Bilirubin 0.4 0.0 - 1.2 mg/dL    Globulin 2.5 gm/dL    A/G Ratio 1.8 g/dL    BUN/Creatinine Ratio 12.0 7.0 - 25.0    Anion Gap 10.0 5.0 - 15.0 mmol/L    eGFR 126.9 >60.0 mL/min/1.73   CBC Auto Differential    Collection Time: 08/19/23  7:51 PM    Specimen: Blood   Result Value Ref Range    WBC 9.22 3.40 - 10.80 10*3/mm3    RBC 5.11 4.14 - 5.80 10*6/mm3    Hemoglobin 14.5 13.0 - 17.7 g/dL    Hematocrit 42.0 37.5 - 51.0 %    MCV 82.2 79.0 - 97.0 fL    MCH 28.4 26.6 - 33.0 pg    MCHC 34.5 31.5 - 35.7 g/dL    RDW 12.3 12.3 - 15.4 %    RDW-SD 36.8 (L) 37.0 - 54.0 fl    MPV 9.7 6.0 - 12.0 fL    Platelets 224  140 - 450 10*3/mm3    Neutrophil % 73.9 42.7 - 76.0 %    Lymphocyte % 15.9 (L) 19.6 - 45.3 %    Monocyte % 9.5 5.0 - 12.0 %    Eosinophil % 0.5 0.3 - 6.2 %    Basophil % 0.1 0.0 - 1.5 %    Immature Grans % 0.1 0.0 - 0.5 %    Neutrophils, Absolute 6.80 1.70 - 7.00 10*3/mm3    Lymphocytes, Absolute 1.47 0.70 - 3.10 10*3/mm3    Monocytes, Absolute 0.88 0.10 - 0.90 10*3/mm3    Eosinophils, Absolute 0.05 0.00 - 0.40 10*3/mm3    Basophils, Absolute 0.01 0.00 - 0.20 10*3/mm3    Immature Grans, Absolute 0.01 0.00 - 0.05 10*3/mm3    nRBC 0.0 0.0 - 0.2 /100 WBC   Lactic Acid, Plasma    Collection Time: 08/19/23  7:51 PM    Specimen: Blood   Result Value Ref Range    Lactate 0.8 0.5 - 2.0 mmol/L       Ordered the above labs and independently reviewed and interpreted the results by me.        RADIOLOGY  CT Soft Tissue Neck With Contrast    Result Date: 8/19/2023  CT NECK SOFT TISSUE WITH CONTRAST  HISTORY: Postoperative swelling under the right jaw  COMPARISON: None available.  TECHNIQUE: Axial CT imaging was obtained through the neck. IV contrast was administered. Coronal and sagittal reformatted images were obtained.  FINDINGS: Visualized portions of the brain parenchyma appear unremarkable. There is no evidence of venous occlusion. Orbits appear unremarkable. Mucous retention cyst is noted within the right ethmoid sinus. Mastoid air cells are clear. Patient does appear to be status post bilateral wisdom tooth extraction. There is some asymmetric soft tissue stranding seen overlying the right mandible. No discrete drainable fluid collection is seen. The nasopharynx is normal. Right parapharyngeal soft tissues may be slightly thickened when compared to the contralateral side. There is some asymmetric enlargement of the right submandibular gland. Right mylohyoid muscle also appears thickened and edematous when compared to the contralateral side. Epiglottis appears normal. Vocal cords also appear normal. There is some subtle  asymmetric enlargement of the inferior right sternocleidomastoid, with some surrounding edema. Thyroid gland and trachea are unremarkable. Images through the lung bases are clear. Prominent right cervical lymph nodes are likely reactive. There is no prevertebral edema.       Patient is status post bilateral wisdom tooth extraction. There is asymmetric inflammatory stranding seen overlying the right side of the mandible, as well as asymmetric edema and enlargement of the floor of the mouth. Right submandibular gland also appears enlarged when compared to the contralateral side. No discrete drainable abscess is seen. There is some fullness within the right parapharyngeal space, but the airway appears intact.  Radiation dose reduction techniques were utilized, including automated exposure control and exposure modulation based on body size.   This report was finalized on 8/19/2023 9:27 PM by Dr. Saniya Carrillo M.D.       I ordered the above noted radiological studies.  These were independently interpreted and reviewed by me.  See dictation for official radiology interpretation.      PROCEDURES    Procedures      MEDICATIONS GIVEN IN ER    Medications   sodium chloride 0.9 % flush 10 mL (has no administration in time range)   sodium chloride 0.9 % flush 10 mL (has no administration in time range)   sodium chloride 0.9 % flush 10 mL (has no administration in time range)   sodium chloride 0.9 % infusion 40 mL (has no administration in time range)   sennosides-docusate (PERICOLACE) 8.6-50 MG per tablet 2 tablet (has no administration in time range)     And   polyethylene glycol (MIRALAX) packet 17 g (has no administration in time range)     And   bisacodyl (DULCOLAX) EC tablet 5 mg (has no administration in time range)     And   bisacodyl (DULCOLAX) suppository 10 mg (has no administration in time range)   morphine injection 2 mg (2 mg Intravenous Given 8/19/23 1952)   ondansetron (ZOFRAN) injection 4 mg (4 mg  Intravenous Given 8/19/23 1952)   ampicillin-sulbactam (UNASYN) 3 g in sodium chloride 0.9 % 100 mL IVPB-VTB (0 g Intravenous Stopped 8/19/23 2045)   iopamidol (ISOVUE-300) 61 % injection 100 mL (75 mL Intravenous Given 8/19/23 2036)   HYDROmorphone (DILAUDID) injection 0.25 mg (0.25 mg Intravenous Given 8/19/23 2139)         PROGRESS, DATA ANALYSIS, CONSULTS, AND MEDICAL DECISION MAKING    All labs have been independently reviewed by me.  All radiology studies have been reviewed by me and discussed with radiologist dictating the report.   EKG's independently viewed and interpreted by me.  Discussion below represents my analysis of pertinent findings related to patient's condition, differential diagnosis, treatment plan and final disposition.    DDx:  Includes, but is not limited to abscess, hematoma    After my initial assessment I am concerned about postsurgical complications and airway compromise and patient may need hospitalization due to OMFS consultation.    ED Course as of 08/19/23 2235   Sat Aug 19, 2023   1914 Oral surgeon: Stacey [TR]   2041 CT Soft Tissue Neck With Contrast  My independent interpretation of the CT of the neck with contrast is right submental swelling without focal fluid collection [TR]   2046 WBC: 9.22 [OSMAR]   2046 Hemoglobin: 14.5 [OSMAR]   2046 Hematocrit: 42.0 [OSMAR]   2046 Platelets: 224 [OSMAR]   2046 Glucose(!): 103 [OSMAR]   2046 BUN: 10 [OSMAR]   2046 Creatinine: 0.83 [OSMAR]   2046 Sodium: 141 [OSMAR]   2046 Potassium: 3.9 [OSMAR]   2046 Chloride: 101 [OSMAR]   2046 CO2(!): 30.0 [OSMAR]   2046 Lactate: 0.8 [OSMRA]   2202 Patient history, ER presentation as well as laboratory and CT findings discussed with Dr. Jiménez, oral surgery, will consult, requested medicine to admit. [OSMAR]   2210 Patient history, ER presentation and evaluation as well as discussions with oral maxillofacial surgery discussed with ANDREWS Ayala, APRN, will place in observation to a [facility] bed per Dr. Wang. [OSMAR]   2219 Patient rechecked,  discussed plan for admission for oral surgery consultation and continued antibiotics.  Patient expresses understanding and agrees with plan. [OSMAR]      ED Course User Index  [OSMAR] Christian Mosley PA  [TR] Mor Snider MD       MDM: Patient will be admitted for IV antibiotics and oral surgery consultation.    PPE:  The patient wore a mask and I wore a mask and all appropriate PPE throughout the entire patient encounter.      AS OF 22:35 EDT VITALS:    BP - 128/67  HR - 64  TEMP - 97.9 øF (36.6 øC)  O2 SATS - 97%      DIAGNOSIS  Final diagnoses:   Right facial swelling   S/P wisdom tooth extraction         DISPOSITION  ADMISSION    Discussed treatment plan and reason for admission with pt/family and admitting physician.  Pt/family voiced understanding of the plan for admission for further testing/treatment as needed.          Christian Mosley PA  08/19/23 7985

## 2023-08-20 NOTE — CONSULTS
Referring Provider: Christian Wang MD  [address] [address]  Reason for Consultation: Concern for submandibular infection    Subjective   History of present illness: This is a 22-year-old male with no significant past medical history status post recent wisdom tooth extraction who was admitted on August 19 with right facial swelling.  The patient underwent for wisdom teeth removal on August 15.  He was doing well until the day prior to admission when he noticed increasing right-sided pain and swelling.  He was given penicillin and Flagyl per his oral surgeon.  Spite this he continued to have increasing swelling and pain and presented to the emergency room.  Aladdin labs revealed a normal white blood cell count.  CT showed asymmetric inflammatory stranding on the right side of the mandible as well as asymmetric edema and enlargement of the floor of the mouth.  Enlargement of the right submandibular gland but no discrete drainable fluid collection.  The patient underwent I&D of right submandibular space with dental extraction yesterday.  He tolerated the procedure well.  Currently states the pain is well controlled.  Tolerating antibiotics without abdominal pain vomiting diarrhea or rash      Past Surgical History:   Procedure Laterality Date    TEETH EXTRACTION N/A 8/20/2023    Procedure: TOOTH EXTRACTION;  Surgeon: Richar Jiménez DMD;  Location: [facility];  Service: Maxillofacial;  Laterality: N/A;        reports that he has never smoked. He has never been exposed to tobacco smoke. He has never used smokeless tobacco. He reports current alcohol use of about 2.0 standard drinks per week. Drug use questions deferred to the physician.    family history is not on file.    No Known Allergies    Medication:  Antibiotics:  Unasyn 3 g IV every 12 hours  Please refer to the medical record for a full medication list    Review of Systems  Pertinent items are noted in HPI, all other systems  reviewed and negative    Objective   Vital Signs   Temp:  [96.5 øF (35.8 øC)-98.5 øF (36.9 øC)] 97.6 øF (36.4 øC)  Heart Rate:  [] 95  Resp:  [12-20] 16  BP: (102-144)/() 130/67    Physical Exam:   General: In no acute distress  HEENT: Normocephalic, atraumatic, no scleral icterus.  Submandibular space with drain in place positive swelling no erythema  Neck: Supple, trachea is midline  Cardiovascular: Normal rate, regular rhythm, normal S1 and S2, no murmurs, rubs, or gallops   Respiratory: Lungs are clear to auscultation bilaterally, no wheezing  GI: Abdomen is soft, nontender, nondistended, positive bowel sounds bilaterally  Musculoskeletal: no edema, tenderness or deformity  Skin: No rashes or lesions  Extremities: No E/C/C  Neurological: Alert and oriented, moving all 4 extremities  Psychiatric: Normal mood and affect     Results Review:   I reviewed the patient's new clinical results.  I reviewed the patient's new imaging results and agree with the interpretation.    Lab Results   Component Value Date    WBC 6.57 08/21/2023    HGB 13.3 08/21/2023    HCT 37.9 08/21/2023    MCV 82.6 08/21/2023     08/21/2023       Lab Results   Component Value Date    GLUCOSE 92 08/21/2023    BUN 9 08/21/2023    CREATININE 0.74 (L) 08/21/2023    BCR 12.2 08/21/2023    CO2 23.6 08/21/2023    CALCIUM 9.0 08/21/2023    ALBUMIN 4.4 08/19/2023    LABIL2 1.6 07/29/2018    AST 16 08/19/2023    ALT 15 08/19/2023       Microbiology:  8/20 OR cx (gram stain GPC) cx P  8/19 BCx NGTD x 2    Radiology:  CT of the soft tissues of the neck shows bilateral wisdom tooth extraction.  Asymmetric inflammatory stranding on the right involving the mandible as well as asymmetric edema and enlargement of the floor of the mouth.  Enlarged right submandibular gland.  No abscess.  Some fullness within the right parapharyngeal space.    Assessment & Plan   Right facial swelling in the setting of recent wisdom tooth extraction status post  incision and drainage with dental extraction on August 20    Continue Unasyn  3 g IV every 6 hours.  Operative cultures with growth food to yield to identify.  We will follow-up cultures tomorrow and hopefully provide final antibiotic recommendations.  Anticipate discharge on oral antibiotics    I discussed the patient's findings and my recommendations with patient and nursing staff

## 2023-08-20 NOTE — PROGRESS NOTES
Patient is in the OR.  I am unable to see him today.  I will plan on seeing him tomorrow.  Medical record has been reviewed.  Continue empiric Unasyn but increase the antibiotic frequency to 3 g IV every 6 hours.  We will follow-up cultures.

## 2023-08-20 NOTE — ANESTHESIA PREPROCEDURE EVALUATION
" Anesthesia Evaluation     Patient summary reviewed and Nursing notes reviewed   no history of anesthetic complications:   NPO Solid Status: > 8 hours  NPO Liquid Status: > 2 hours           Airway   Mallampati: II  Comment: CT: patent airway    Fluctuant mass right side of jaw/neck.  Decreased ROM d/t pain.    Dental      Pulmonary - negative pulmonary ROS   Cardiovascular - negative cardio ROS  Exercise tolerance: good (4-7 METS)        Neuro/Psych- negative ROS  GI/Hepatic/Renal/Endo - negative ROS     Musculoskeletal (-) negative ROS    Abdominal    Substance History - negative use     OB/GYN negative ob/gyn ROS         Other                      Anesthesia Plan    ASA 1     general     (/86 (BP Location: Right arm, Patient Position: Lying)   Pulse 83   Temp 36.2 øC (97.2 øF) (Oral)   Resp 16   Ht 182.9 cm (72\")   Wt 77.1 kg (170 lb)   SpO2 100%   BMI 23.06 kg/mý     I have reviewed the patient's history with the patient and the chart, including all pertinent laboratory results and imaging. I have explained the risks of anesthesia including but not limited to dental damage, corneal abrasion, nerve injury, MI, stroke, and death.    )  intravenous induction     Anesthetic plan, risks, benefits, and alternatives have been provided, discussed and informed consent has been obtained with: patient.    CODE STATUS:    Code Status (Patient has no pulse and is not breathing): CPR (Attempt to Resuscitate)  Medical Interventions (Patient has pulse or is breathing): Full Support      "

## 2023-08-20 NOTE — OP NOTE
PREOPERATIVE DIAGNOSIS:  Right submandibular space infection.     POSTOPERATIVE DIAGNOSIS:  Right submandibular space infection.     PROCEDURE PERFORMED:  Incision and drainage of right submandibular space and extraction of tooth.     SURGEON:  Richar Jiménez DMD     ANESTHESIA:  General.     ESTIMATED BLOOD LOSS:  Minimal.      SPECIMENS:  Aspirate from submandibular space.      DRAINS:  One Penrose in the submandibular space.      INDICATIONS/CONSENT:  This is a 22-year-old male who earlier in the week had teeth extracted for dental caries and pericoronitis.  He progressed well through the week however yesterday he began developing progressive swelling of the right submandibular area and difficulty swallowing despite being on oral penicillin.  He presented to the emergency room and after clinical and radiographic evaluation it was felt that he would benefit from admission, IV antibiotic therapy, and incision and drainage.  Tooth #31 which had been planned for restorative care by his dentist had caries and as this is the only problematic area which remained, and the extraction site from tooth #32 from earlier in the week did not appear to be infected, it was felt that he would benefit from removal of this tooth as the potential source of the infection.  The procedure, potential risks and complications were explained to the patient and both verbal and written consent were obtained.       DESCRIPTION OF PROCEDURE:  The patient was taken to the operating room and placed in the supine position on the operating room table.  A state of general anesthesia was induced and an oral endotracheal tube was placed.  The right mandible was infiltrated with 1% lidocaine with 1:100,000 epinephrine.  A throat pack was placed.  The patient was prepped and draped in a sterile fashion.  Attention was first directed extra-orally where an incision was made below the inferior border of the mandible approximately 1 cm below and anterior  to the antegonial notch through skin and subcutaneous tissue approximately 7 mm in length.  Then, using a hemostat blunt dissection was carried through the superficial fascia and superficial layer of the deep cervical fascia into the right submandibular space where purulent discharge was encountered.  An aspirate was sent, for Gram stain culture and sensitivity, to microbiology.  Once the right submandibular space had been thoroughly explored with a hemostat a Penrose drain was placed into the space and secured to the skin using a 3-0 silk suture in interrupted fashion.  Attention was then directed intra-orally where tooth #31 was extracted by reflecting full-thickness mucoperiosteal flap, removing the tooth and smoothing irregular bone.  The socket of tooth #32 was thoroughly explored during this time too and no pus or loose bone was observed.  The wound was thoroughly irrigated and the oral mucosa closed using 3-0 chromic gut in an interrupted fashion.  Dry dressings were placed.  The throat pack was removed. The oropharynx was thoroughly suctioned.  The patient was awakened, extubated and taken to the recovery room.  There were no complications.  All counts were correct x3.

## 2023-08-20 NOTE — PLAN OF CARE
Vss, 2l Nc, A&O, up with assistance. Returned from surgery late this afternoon. Primrose drain in place on anterior neck draining serosanguinous fluid, PRN morphine and zofran given, IVF infusing, iv abx. Significant other at bedside.

## 2023-08-20 NOTE — ANESTHESIA POSTPROCEDURE EVALUATION
"Patient: Willian Pires    Procedure Summary       Date: 08/20/23 Room / Location: [facility] OR  / [facility] MAIN OR    Anesthesia Start: 1247 Anesthesia Stop: 1340    Procedure: TOOTH EXTRACTION (Mouth) Diagnosis:     Surgeons: Richar Jiménez DMD Provider: Christian Leary MD    Anesthesia Type: general ASA Status: 1            Anesthesia Type: general    Vitals  Vitals Value Taken Time   /72 08/20/23 1430   Temp 36.9 øC (98.5 øF) 08/20/23 1340   Pulse 73 08/20/23 1436   Resp     SpO2 99 % 08/20/23 1436   Vitals shown include unvalidated device data.        Post Anesthesia Care and Evaluation    Patient location during evaluation: bedside  Patient participation: complete - patient participated  Level of consciousness: awake  Pain management: adequate    Airway patency: patent  Anesthetic complications: No anesthetic complications  PONV Status: controlled  Cardiovascular status: acceptable  Respiratory status: acceptable  Hydration status: acceptable    Comments: /72   Pulse 74   Temp 36.9 øC (98.5 øF) (Oral)   Resp 16   Ht 182.9 cm (72\")   Wt 77.1 kg (170 lb)   SpO2 99%   BMI 23.06 kg/mý       "

## 2023-08-20 NOTE — ED NOTES
Nursing report ED to floor  Willian Pires  22 y.o.  male    HPI :   Chief Complaint   Patient presents with    Oral Swelling       Admitting doctor:   Christian Wang MD    Admitting diagnosis:   The primary encounter diagnosis was Right facial swelling. A diagnosis of S/P wisdom tooth extraction was also pertinent to this visit.    Code status:   Current Code Status       Date Active Code Status Order ID Comments User Context       Not on file            Allergies:   Patient has no known allergies.    Isolation:   No active isolations    Intake and Output  No intake or output data in the 24 hours ending 08/19/23 2221    Weight:       08/19/23  1909   Weight: 77.1 kg (170 lb)       Most recent vitals:   Vitals:    08/19/23 2031 08/19/23 2101 08/19/23 2115 08/19/23 2201   BP: 121/80 129/71  128/67   Pulse: 67 68 64    Resp:       Temp:       SpO2: 97% 96% 97%    Weight:       Height:           Active LDAs/IV Access:   Lines, Drains & Airways       Active LDAs       Name Placement date Placement time Site Days    Peripheral IV 08/19/23 1947 Left Antecubital 08/19/23 1947  Antecubital  less than 1                    Labs (abnormal labs have a star):   Labs Reviewed   COMPREHENSIVE METABOLIC PANEL - Abnormal; Notable for the following components:       Result Value    Glucose 103 (*)     CO2 30.0 (*)     All other components within normal limits    Narrative:     GFR Normal >60  Chronic Kidney Disease <60  Kidney Failure <15     CBC WITH AUTO DIFFERENTIAL - Abnormal; Notable for the following components:    RDW-SD 36.8 (*)     Lymphocyte % 15.9 (*)     All other components within normal limits   LACTIC ACID, PLASMA - Normal   BLOOD CULTURE   BLOOD CULTURE   CBC AND DIFFERENTIAL    Narrative:     The following orders were created for panel order CBC & Differential.  Procedure                               Abnormality         Status                     ---------                               -----------         ------                      CBC Auto Differential[[id number]]        Abnormal            Final result                 Please view results for these tests on the individual orders.       EKG:   No orders to display       Meds given in ED:   Medications   sodium chloride 0.9 % flush 10 mL (has no administration in time range)   morphine injection 2 mg (2 mg Intravenous Given 8/19/23 1952)   ondansetron (ZOFRAN) injection 4 mg (4 mg Intravenous Given 8/19/23 1952)   ampicillin-sulbactam (UNASYN) 3 g in sodium chloride 0.9 % 100 mL IVPB-VTB (0 g Intravenous Stopped 8/19/23 2045)   iopamidol (ISOVUE-300) 61 % injection 100 mL (75 mL Intravenous Given 8/19/23 2036)   HYDROmorphone (DILAUDID) injection 0.25 mg (0.25 mg Intravenous Given 8/19/23 2139)       Imaging results:  CT Soft Tissue Neck With Contrast    Result Date: 8/19/2023  Patient is status post bilateral wisdom tooth extraction. There is asymmetric inflammatory stranding seen overlying the right side of the mandible, as well as asymmetric edema and enlargement of the floor of the mouth. Right submandibular gland also appears enlarged when compared to the contralateral side. No discrete drainable abscess is seen. There is some fullness within the right parapharyngeal space, but the airway appears intact.  Radiation dose reduction techniques were utilized, including automated exposure control and exposure modulation based on body size.   This report was finalized on 8/19/2023 9:27 PM by Dr. Saniya Carrillo M.D.       Ambulatory status:   - up ad mey    Social issues:   Social History     Socioeconomic History    Marital status: Single       NIH Stroke Scale:       Roberta Flores RN  08/19/23 22:21 EDT

## 2023-08-20 NOTE — ED NOTES
Dr. Jiménez called and spoke with this nurse. Dr. Jiménez updated on pt's status and treatment plan up to this point.

## 2023-08-20 NOTE — PROGRESS NOTES
Name: Willian Pires ADMIT: 2023   : 2000  PCP: Provider, No Known    MRN: [id number] LOS: 0 days   AGE/SEX: 22 y.o. male  ROOM: [facility]/1     Subjective   Subjective   Patient seen this morning, complains of significant right facial pain, not well controlled on current regimen, on Dilaudid 0.25 mg every 2 as needed for pain, reports it only takes pain down from 9-7, has not slept well due to pain.  Denies fevers or chills.  On IV Unasyn    Review of Systems     As above  Objective   Objective   Vital Signs  Temp:  [96.9 øF (36.1 øC)-98.3 øF (36.8 øC)] 96.9 øF (36.1 øC)  Heart Rate:  [] 76  Resp:  [16-18] 16  BP: (121-137)/(67-81) 126/73  SpO2:  [95 %-97 %] 95 %  on   ;      Body mass index is 23.06 kg/mý.  Physical Exam    General: Alert and oriented x3, no acute distress  HEENT: Normocephalic, atraumatic  CV: Regular rate and rhythm, no murmurs rubs or gallops  Lungs: Clear to auscultation bilaterally, no crackles or wheezes  Abdomen: Soft, nontender, nondistended  Extremities: No significant peripheral edema , no cyanosis     Results Review     I reviewed the patient's new clinical results.  Results from last 7 days   Lab Units 23  0643 23   WBC 10*3/mm3 10.88* 9.22   HEMOGLOBIN g/dL 14.3 14.5   PLATELETS 10*3/mm3 214 224     Results from last 7 days   Lab Units 23  0643 23   SODIUM mmol/L 139 141   POTASSIUM mmol/L 4.3 3.9   CHLORIDE mmol/L 102 101   CO2 mmol/L 26.0 30.0*   BUN mg/dL 9 10   CREATININE mg/dL 0.74* 0.83   GLUCOSE mg/dL 102* 103*   Estimated Creatinine Clearance: 170.8 mL/min (A) (by C-G formula based on SCr of 0.74 mg/dL (L)).  Results from last 7 days   Lab Units 23   ALBUMIN g/dL 4.4   BILIRUBIN mg/dL 0.4   ALK PHOS U/L 79   AST (SGOT) U/L 16   ALT (SGPT) U/L 15     Results from last 7 days   Lab Units 23  0643 23   CALCIUM mg/dL 9.2 9.7   ALBUMIN g/dL  --  4.4     Results from last 7 days   Lab Units  08/19/23 1951   LACTATE mmol/L 0.8   No results found for: COVID19  No results found for: HGBA1C, POCGLU        CT Soft Tissue Neck With Contrast  Narrative: CT NECK SOFT TISSUE WITH CONTRAST     HISTORY: Postoperative swelling under the right jaw     COMPARISON: None available.     TECHNIQUE: Axial CT imaging was obtained through the neck. IV contrast  was administered. Coronal and sagittal reformatted images were obtained.     FINDINGS:  Visualized portions of the brain parenchyma appear unremarkable. There  is no evidence of venous occlusion. Orbits appear unremarkable. Mucous  retention cyst is noted within the right ethmoid sinus. Mastoid air  cells are clear. Patient does appear to be status post bilateral wisdom  tooth extraction. There is some asymmetric soft tissue stranding seen  overlying the right mandible. No discrete drainable fluid collection is  seen. The nasopharynx is normal. Right parapharyngeal soft tissues may  be slightly thickened when compared to the contralateral side. There is  some asymmetric enlargement of the right submandibular gland. Right  mylohyoid muscle also appears thickened and edematous when compared to  the contralateral side. Epiglottis appears normal. Vocal cords also  appear normal. There is some subtle asymmetric enlargement of the  inferior right sternocleidomastoid, with some surrounding edema. Thyroid  gland and trachea are unremarkable. Images through the lung bases are  clear. Prominent right cervical lymph nodes are likely reactive. There  is no prevertebral edema.        Impression: Patient is status post bilateral wisdom tooth extraction. There is  asymmetric inflammatory stranding seen overlying the right side of the  mandible, as well as asymmetric edema and enlargement of the floor of  the mouth. Right submandibular gland also appears enlarged when compared  to the contralateral side. No discrete drainable abscess is seen. There  is some fullness within the  right parapharyngeal space, but the airway  appears intact.     Radiation dose reduction techniques were utilized, including automated  exposure control and exposure modulation based on body size.        This report was finalized on 8/19/2023 9:27 PM by Dr. Saniya Carrillo M.D.       Scheduled Medications  ampicillin-sulbactam, 3 g, Intravenous, Q12H  pantoprazole, 40 mg, Intravenous, Q AM  senna-docusate sodium, 2 tablet, Oral, BID  sodium chloride, 10 mL, Intravenous, Q12H    Infusions  sodium chloride, 75 mL/hr, Last Rate: 75 mL/hr (08/20/23 0748)    Diet  NPO Diet NPO Type: Strict NPO    I have personally reviewed     [x]  Laboratory   [x]  Microbiology   [x]  Radiology   []  EKG/Telemetry  []  Cardiology/Vascular   []  Pathology    []  Records       Assessment/Plan     Active Hospital Problems    Diagnosis  POA    **Right facial swelling [R22.0]  Yes    Oral pain [K13.79]  Yes    S/P wisdom tooth extraction [Z98.818]  Not Applicable      Resolved Hospital Problems   No resolved problems to display.       22 y.o. male admitted with Right facial swelling.    Right submandibular cellulitis//S/P wisdom tooth extraction  Initiated on IV fluids while n.p.o.,  Initiated on IV pantoprazole for GI prophylaxis while n.p.o., and due to recent NSAID use for pain,  Increase IV Dilaudid to 0.5 mg every 2 as needed for pain, one-time IV 30 mg Toradol ordered  Continue IV Unasyn  ID consulted  Oromaxillary surgery following, plan for or for I&D of right submandibular space, and extraction of the tooth 31        SCDs for DVT prophylaxis.  Full code.  Discussed with patient.        Copied text in this note has been reviewed and is accurate as of 08/20/23.         Dictated utilizing Dragon dictation        Angela Davis MD  [facility]  08/20/23  08:41 EDT

## 2023-08-20 NOTE — BRIEF OP NOTE
TOOTH EXTRACTION  Progress Note    Willian Lexis  8/20/2023    Pre-op Diagnosis:   Right submandibular space abscess    Post-Op Diagnosis Codes:   same    Procedure/CPTr Codes:        Procedure(s):  I and D right submandibular space, extraction            Surgeon(s):  Richar Jiménez DMD    Anesthesia: General    Staff:   Circulator: Todd Maynard RN  Scrub Person: Rafi Chairez; Neetu Napier         Estimated Blood Loss: minimal    Urine Voided: * No values recorded between 8/20/2023 12:47 PM and 8/20/2023  1:21 PM *    Specimens:                Specimens       ID Source Type Tests Collected By Collected At Frozen?    1 Oral Cavity Surgical Site ANAEROBIC CULTURE  GRAM STAIN - NO CULTURE   Richar Jiménez DMD 8/20/23 1304     Description: aerobic, anerobic, gram stain for sensitivity    Comment: aerobic, anerobic, gram stain for sensitivity                  Drains:   Open Drain Inferior;Right  (Active)       Findings: abscess right submandibular space        Complications: none          Richar Jiménez DMD     Date: 8/20/2023  Time: 13:25 EDT

## 2023-08-20 NOTE — H&P
Patient Name:  Willian Pires  YOB: 2000  MRN:  [id number]  Admit Date:  8/19/2023  Patient Care Team:  Provider, No Known as PCP - General      Subjective   History Present Illness     Chief Complaint   Patient presents with    Oral Swelling     History of Present Illness  Mr. Pires  is a 22 year old male with no medical history who presents to [facility] today with complaints of  increased swelling of the right side of his face, mandible, and under right side of his tongue. He does report having four wisdom teeth removed last Tuesday. He states he was doing well until yesterday when he noticed increased swelling and pain. He was seen by his oral surgeon earlier today who changed his antibiotics to penicillin and Flagyl He states later he went home and took a nap  and awoke with severe pain and increased edema of the right side of his face that has spread under the right side of his tongue.  He states that he called on-call service for his oral surgeon who advised him to report to the emergency department.  He reports 7 out of 10 pain that is worse with swallowing, no alleviating factors.  Denies any other acute distress.  He does have significant edema to right  mandible, that is tender to touch.    Initial evaluation in the emergency department  Blood pressure 135/75, HR 55, RR 18, oxygen saturation 96% on room air, he is afebrile with a Tmax of 97.9.  CT of soft tissues of the neck remarkable for asymmetric inflammatory stranding seen overlying the right side of the mandible.  As well as asymmetric edema and enlargement of the floor of the mouth.  Right submandibular gland also appears enlarged when compared to contralateral side.  No identifiable abscess there is fullness within the right parapharyngeal space.  He was administered IV morphine, IV Dilaudid, ondansetron, and started on Unasyn.    He will be admitted to the hospitalist care for further evaluation and treatment of  right mandible edema with oral surgery consulted.      Review of Systems   HENT:  Positive for dental problem. Negative for sinus pressure, sinus pain, sore throat, trouble swallowing and voice change.    Eyes: Negative.    Respiratory: Negative.     Cardiovascular: Negative.    Gastrointestinal: Negative.    Endocrine: Negative.    Genitourinary: Negative.    Musculoskeletal: Negative.    Skin: Negative.    Allergic/Immunologic: Negative.    Neurological: Negative.    Psychiatric/Behavioral: Negative.     All other systems reviewed and are negative.     Personal History     History reviewed. No pertinent past medical history.  History reviewed. No pertinent surgical history.  History reviewed. No pertinent family history.  Social History     Tobacco Use    Smoking status: Never     Passive exposure: Never    Smokeless tobacco: Never   Vaping Use    Vaping Use: Every day    Start date: 7/19/2023    Substances: Nicotine    Devices: Pre-filled or refillable cartridge    Passive vaping exposure: Yes   Substance Use Topics    Alcohol use: Yes     Alcohol/week: 2.0 standard drinks     Types: 2 Drinks containing 0.5 oz of alcohol per week    Drug use: Defer     Frequency: 7.0 times per week     Types: Marijuana     No current facility-administered medications on file prior to encounter.     No current outpatient medications on file prior to encounter.     No Known Allergies    Objective    Objective     Vital Signs  Temp:  [97.9 øF (36.6 øC)-98.3 øF (36.8 øC)] 98.3 øF (36.8 øC)  Heart Rate:  [] 55  Resp:  [18] 18  BP: (121-137)/(67-81) 135/75  SpO2:  [96 %-97 %] 97 %  on   ;      Body mass index is 23.06 kg/mý.    Physical Exam  Vitals and nursing note reviewed.   Constitutional:       General: He is awake.      Appearance: He is well-developed.   HENT:      Head: Atraumatic.      Jaw: Tenderness, swelling and pain on movement present.      Salivary Glands: Right salivary gland is diffusely enlarged and  tender.      Mouth/Throat:      Mouth: Mucous membranes are dry.   Eyes:      Conjunctiva/sclera: Conjunctivae normal.   Cardiovascular:      Rate and Rhythm: Normal rate and regular rhythm.      Pulses: Normal pulses.      Heart sounds: Normal heart sounds.   Pulmonary:      Effort: Pulmonary effort is normal.   Abdominal:      General: Bowel sounds are normal.      Palpations: Abdomen is soft.   Musculoskeletal:         General: Normal range of motion.      Cervical back: Normal range of motion.   Skin:     General: Skin is warm and dry.      Capillary Refill: Capillary refill takes less than 2 seconds.   Neurological:      General: No focal deficit present.      Mental Status: He is alert and oriented to person, place, and time.   Psychiatric:         Mood and Affect: Mood normal.         Behavior: Behavior is cooperative.       Results Review:  I reviewed the patient's new clinical results.  I reviewed the patient's new imaging results and agree with the interpretation.  I reviewed the patient's other test results and agree with the interpretation  I personally viewed and interpreted the patient's EKG/Telemetry data  Discussed with ED provider.    Lab Results (last 24 hours)       Procedure Component Value Units Date/Time    CBC & Differential [[id number]]  (Abnormal) Collected: 08/19/23 1951    Specimen: Blood Updated: 08/19/23 2003    Narrative:      The following orders were created for panel order CBC & Differential.  Procedure                               Abnormality         Status                     ---------                               -----------         ------                     CBC Auto Differential[[id number]]        Abnormal            Final result                 Please view results for these tests on the individual orders.    Comprehensive Metabolic Panel [[id number]]  (Abnormal) Collected: 08/19/23 1951    Specimen: Blood Updated: 08/19/23 2021     Glucose 103 mg/dL      BUN 10 mg/dL       Creatinine 0.83 mg/dL      Sodium 141 mmol/L      Potassium 3.9 mmol/L      Chloride 101 mmol/L      CO2 30.0 mmol/L      Calcium 9.7 mg/dL      Total Protein 6.9 g/dL      Albumin 4.4 g/dL      ALT (SGPT) 15 U/L      AST (SGOT) 16 U/L      Alkaline Phosphatase 79 U/L      Total Bilirubin 0.4 mg/dL      Globulin 2.5 gm/dL      A/G Ratio 1.8 g/dL      BUN/Creatinine Ratio 12.0     Anion Gap 10.0 mmol/L      eGFR 126.9 mL/min/1.73     Narrative:      GFR Normal >60  Chronic Kidney Disease <60  Kidney Failure <15      CBC Auto Differential [[id number]]  (Abnormal) Collected: 08/19/23 1951    Specimen: Blood Updated: 08/19/23 2003     WBC 9.22 10*3/mm3      RBC 5.11 10*6/mm3      Hemoglobin 14.5 g/dL      Hematocrit 42.0 %      MCV 82.2 fL      MCH 28.4 pg      MCHC 34.5 g/dL      RDW 12.3 %      RDW-SD 36.8 fl      MPV 9.7 fL      Platelets 224 10*3/mm3      Neutrophil % 73.9 %      Lymphocyte % 15.9 %      Monocyte % 9.5 %      Eosinophil % 0.5 %      Basophil % 0.1 %      Immature Grans % 0.1 %      Neutrophils, Absolute 6.80 10*3/mm3      Lymphocytes, Absolute 1.47 10*3/mm3      Monocytes, Absolute 0.88 10*3/mm3      Eosinophils, Absolute 0.05 10*3/mm3      Basophils, Absolute 0.01 10*3/mm3      Immature Grans, Absolute 0.01 10*3/mm3      nRBC 0.0 /100 WBC     Lactic Acid, Plasma [[id number]]  (Normal) Collected: 08/19/23 1951    Specimen: Blood Updated: 08/19/23 2019     Lactate 0.8 mmol/L     Blood Culture - Blood, Arm, Left [[id number]] Collected: 08/19/23 1951    Specimen: Blood from Arm, Left Updated: 08/19/23 2003    Blood Culture - Blood, Arm, Right [[id number]] Collected: 08/19/23 2004    Specimen: Blood from Arm, Right Updated: 08/19/23 2009            Imaging Results (Last 24 Hours)       Procedure Component Value Units Date/Time    CT Soft Tissue Neck With Contrast [[id number]] Collected: 08/19/23 2112     Updated: 08/19/23 2130    Narrative:      CT NECK SOFT TISSUE WITH CONTRAST     HISTORY: Postoperative  swelling under the right jaw     COMPARISON: None available.     TECHNIQUE: Axial CT imaging was obtained through the neck. IV contrast  was administered. Coronal and sagittal reformatted images were obtained.     FINDINGS:  Visualized portions of the brain parenchyma appear unremarkable. There  is no evidence of venous occlusion. Orbits appear unremarkable. Mucous  retention cyst is noted within the right ethmoid sinus. Mastoid air  cells are clear. Patient does appear to be status post bilateral wisdom  tooth extraction. There is some asymmetric soft tissue stranding seen  overlying the right mandible. No discrete drainable fluid collection is  seen. The nasopharynx is normal. Right parapharyngeal soft tissues may  be slightly thickened when compared to the contralateral side. There is  some asymmetric enlargement of the right submandibular gland. Right  mylohyoid muscle also appears thickened and edematous when compared to  the contralateral side. Epiglottis appears normal. Vocal cords also  appear normal. There is some subtle asymmetric enlargement of the  inferior right sternocleidomastoid, with some surrounding edema. Thyroid  gland and trachea are unremarkable. Images through the lung bases are  clear. Prominent right cervical lymph nodes are likely reactive. There  is no prevertebral edema.          Impression:      Patient is status post bilateral wisdom tooth extraction. There is  asymmetric inflammatory stranding seen overlying the right side of the  mandible, as well as asymmetric edema and enlargement of the floor of  the mouth. Right submandibular gland also appears enlarged when compared  to the contralateral side. No discrete drainable abscess is seen. There  is some fullness within the right parapharyngeal space, but the airway  appears intact.     Radiation dose reduction techniques were utilized, including automated  exposure control and exposure modulation based on body size.        This report  was finalized on 8/19/2023 9:27 PM by Dr. Saniya Carrillo M.D.                   No orders to display        Assessment/Plan     Active Hospital Problems    Diagnosis  POA    **Right facial swelling [R22.0]  Yes    Oral pain [K13.79]  Yes    S/P wisdom tooth extraction [Z98.818]  Not Applicable      Resolved Hospital Problems   No resolved problems to display.     Right facial swelling  Oral pain  S/P wisdom tooth extraction  Oral surgery consulted  Continue IV hydromorphone for pain management  Continue Unasyn for possible infectious process  N.p.o. after midnight pending oral surgery evaluation  Clear liquid diet  Vital signs per policy        VTE Prophylaxis - SCDs.  Code Status - Full code.       HALEY Ordaz  [facility]  08/20/23  00:20 EDT  Patient urinated prior to midnight, delay in documentation due to patient care.

## 2023-08-20 NOTE — ANESTHESIA PROCEDURE NOTES
Airway  Urgency: elective    Date/Time: 8/20/2023 12:55 PM  Airway not difficult    General Information and Staff    Patient location during procedure: OR  Anesthesiologist: Christian Leary MD    Indications and Patient Condition  Indications for airway management: airway protection    Preoxygenated: yes  Mask difficulty assessment: 1 - vent by mask    Final Airway Details  Final airway type: endotracheal airway      Successful airway: ETT  Cuffed: yes   Successful intubation technique: direct laryngoscopy  Facilitating devices/methods: intubating stylet and cricoid pressure  Endotracheal tube insertion site: oral  Blade: Ephraim  Blade size: 4  ETT size (mm): 7.5  Cormack-Lehane Classification: grade IIa - partial view of glottis  Placement verified by: chest auscultation   Measured from: lips  ETT/EBT  to lips (cm): 22  Number of attempts at approach: 1  Assessment: lips, teeth, and gum same as pre-op and atraumatic intubation

## 2023-08-20 NOTE — PLAN OF CARE
Goal Outcome Evaluation:  Plan of Care Reviewed With: patient    Progress: no change    Administered PRN Dilaudid 0.25 mg 3X this shift. Pain is still not adequately controlled per pt. NPO since midnight. Will continue with plan of care.

## 2023-08-20 NOTE — CONSULTS
[facility]   Consult Note    Patient Name: Willian Pires  : 2000  MRN: [id number]  Primary Care Physician:  Provider, No Known  Referring Physician: Christian Wang MD  Date of admission: 2023    Oral & Maxillofacial (on-call MD unless specified)  Consult performed by: Richar Jiménez DMD  Consult ordered by: Christian Mosley PA      Subjective   Subjective     Reason for Consult/ Chief Complaint: right facial swelling    History of Present Illness  Willian Pires is a 22 y.o. male who had extractions  performed on 8/15/23.  Yesterday he began developing progressive swelling of the right jaw despite being on oral penicillin    Review of Systems dysphagia, jaw pain, trismus    Personal History     History reviewed. No pertinent past medical history.    History reviewed. No pertinent surgical history.    Family History: family history is not on file. Otherwise pertinent FHx was reviewed and not pertinent to current issue.    Social History:  reports that he has never smoked. He has never been exposed to tobacco smoke. He has never used smokeless tobacco. He reports current alcohol use of about 2.0 standard drinks per week. Drug use questions deferred to the physician. Positive Marijuana use    Home Medications: penicillin       Allergies:  No Known Allergies    Objective    Objective     Vitals:  Temp:  [96.9 øF (36.1 øC)-98.3 øF (36.8 øC)] 96.9 øF (36.1 øC)  Heart Rate:  [] 76  Resp:  [16-18] 16  BP: (121-137)/(67-81) 126/73    Physical Exam 1/4 right firm submandibular swelling, ASH 25mm, no palatal draping, recent extraction sites of 3rd molars and tooth 32, no swelling or pus intraorally.  Caries tooth #31    Result Review    Result Review:  I have personally reviewed the results from the time of this admission to 2023 07:58 EDT and agree with these findings:  [x]  Laboratory list / accordion  []  Microbiology  [x]  Radiology  []  EKG/Telemetry   []  Cardiology/Vascular   []   Pathology  []  Old records  []  Other:  Most notable findings include: right submandibular swelling      Assessment & Plan   Assessment / Plan     Brief Patient Summary:  Willian Pires is a 22 y.o. male who has right submandibular cellulitis which appears to be secondary to tooth #31    Active Hospital Problems:  Active Hospital Problems    Diagnosis     **Right facial swelling     Oral pain     S/P wisdom tooth extraction      Plan:   To OR for I and D of right submandibular space. Extraction of tooth 31    Richar Jiménez, DMD

## 2023-08-21 LAB
ANION GAP SERPL CALCULATED.3IONS-SCNC: 9.4 MMOL/L (ref 5–15)
BUN SERPL-MCNC: 9 MG/DL (ref 6–20)
BUN/CREAT SERPL: 12.2 (ref 7–25)
CALCIUM SPEC-SCNC: 9 MG/DL (ref 8.6–10.5)
CHLORIDE SERPL-SCNC: 103 MMOL/L (ref 98–107)
CO2 SERPL-SCNC: 23.6 MMOL/L (ref 22–29)
CREAT SERPL-MCNC: 0.74 MG/DL (ref 0.76–1.27)
DEPRECATED RDW RBC AUTO: 37.3 FL (ref 37–54)
EGFRCR SERPLBLD CKD-EPI 2021: 131.4 ML/MIN/1.73
ERYTHROCYTE [DISTWIDTH] IN BLOOD BY AUTOMATED COUNT: 12.5 % (ref 12.3–15.4)
GLUCOSE SERPL-MCNC: 92 MG/DL (ref 65–99)
HCT VFR BLD AUTO: 37.9 % (ref 37.5–51)
HGB BLD-MCNC: 13.3 G/DL (ref 13–17.7)
MCH RBC QN AUTO: 29 PG (ref 26.6–33)
MCHC RBC AUTO-ENTMCNC: 35.1 G/DL (ref 31.5–35.7)
MCV RBC AUTO: 82.6 FL (ref 79–97)
PLATELET # BLD AUTO: 212 10*3/MM3 (ref 140–450)
PMV BLD AUTO: 9.9 FL (ref 6–12)
POTASSIUM SERPL-SCNC: 4.1 MMOL/L (ref 3.5–5.2)
RBC # BLD AUTO: 4.59 10*6/MM3 (ref 4.14–5.8)
SODIUM SERPL-SCNC: 136 MMOL/L (ref 136–145)
WBC NRBC COR # BLD: 6.57 10*3/MM3 (ref 3.4–10.8)

## 2023-08-21 PROCEDURE — 25010000002 AMPICILLIN-SULBACTAM PER 1.5 G: Performed by: DENTIST

## 2023-08-21 PROCEDURE — G0378 HOSPITAL OBSERVATION PER HR: HCPCS

## 2023-08-21 PROCEDURE — 99204 OFFICE O/P NEW MOD 45 MIN: CPT | Performed by: INTERNAL MEDICINE

## 2023-08-21 PROCEDURE — 25010000002 ONDANSETRON PER 1 MG: Performed by: DENTIST

## 2023-08-21 PROCEDURE — 85027 COMPLETE CBC AUTOMATED: CPT | Performed by: STUDENT IN AN ORGANIZED HEALTH CARE EDUCATION/TRAINING PROGRAM

## 2023-08-21 PROCEDURE — 80048 BASIC METABOLIC PNL TOTAL CA: CPT | Performed by: STUDENT IN AN ORGANIZED HEALTH CARE EDUCATION/TRAINING PROGRAM

## 2023-08-21 PROCEDURE — 25010000002 MORPHINE PER 10 MG: Performed by: DENTIST

## 2023-08-21 PROCEDURE — 25010000002 KETOROLAC TROMETHAMINE PER 15 MG: Performed by: DENTIST

## 2023-08-21 RX ORDER — OXYCODONE HYDROCHLORIDE AND ACETAMINOPHEN 5; 325 MG/1; MG/1
1 TABLET ORAL EVERY 6 HOURS PRN
Status: DISCONTINUED | OUTPATIENT
Start: 2023-08-21 | End: 2023-08-22 | Stop reason: HOSPADM

## 2023-08-21 RX ADMIN — Medication 10 ML: at 08:21

## 2023-08-21 RX ADMIN — AMPICILLIN SODIUM AND SULBACTAM SODIUM 3 G: 2; 1 INJECTION, POWDER, FOR SOLUTION INTRAMUSCULAR; INTRAVENOUS at 14:25

## 2023-08-21 RX ADMIN — SENNOSIDES AND DOCUSATE SODIUM 2 TABLET: 50; 8.6 TABLET ORAL at 08:21

## 2023-08-21 RX ADMIN — ONDANSETRON 4 MG: 2 INJECTION INTRAMUSCULAR; INTRAVENOUS at 13:59

## 2023-08-21 RX ADMIN — KETOROLAC TROMETHAMINE 30 MG: 30 INJECTION, SOLUTION INTRAMUSCULAR; INTRAVENOUS at 02:35

## 2023-08-21 RX ADMIN — AMPICILLIN SODIUM AND SULBACTAM SODIUM 3 G: 2; 1 INJECTION, POWDER, FOR SOLUTION INTRAMUSCULAR; INTRAVENOUS at 20:33

## 2023-08-21 RX ADMIN — MORPHINE SULFATE 2 MG: 2 INJECTION, SOLUTION INTRAMUSCULAR; INTRAVENOUS at 04:28

## 2023-08-21 RX ADMIN — KETOROLAC TROMETHAMINE 30 MG: 30 INJECTION, SOLUTION INTRAMUSCULAR; INTRAVENOUS at 12:04

## 2023-08-21 RX ADMIN — KETOROLAC TROMETHAMINE 30 MG: 30 INJECTION, SOLUTION INTRAMUSCULAR; INTRAVENOUS at 20:43

## 2023-08-21 RX ADMIN — AMPICILLIN SODIUM AND SULBACTAM SODIUM 3 G: 2; 1 INJECTION, POWDER, FOR SOLUTION INTRAMUSCULAR; INTRAVENOUS at 08:21

## 2023-08-21 RX ADMIN — PANTOPRAZOLE SODIUM 40 MG: 40 INJECTION, POWDER, FOR SOLUTION INTRAVENOUS at 06:55

## 2023-08-21 RX ADMIN — Medication 10 ML: at 20:44

## 2023-08-21 RX ADMIN — SODIUM CHLORIDE 75 ML/HR: 9 INJECTION, SOLUTION INTRAVENOUS at 02:30

## 2023-08-21 RX ADMIN — SENNOSIDES AND DOCUSATE SODIUM 2 TABLET: 50; 8.6 TABLET ORAL at 20:33

## 2023-08-21 RX ADMIN — AMPICILLIN SODIUM AND SULBACTAM SODIUM 3 G: 2; 1 INJECTION, POWDER, FOR SOLUTION INTRAMUSCULAR; INTRAVENOUS at 02:30

## 2023-08-21 RX ADMIN — OXYCODONE HYDROCHLORIDE AND ACETAMINOPHEN 1 TABLET: 5; 325 TABLET ORAL at 14:25

## 2023-08-21 RX ADMIN — OXYCODONE HYDROCHLORIDE AND ACETAMINOPHEN 1 TABLET: 5; 325 TABLET ORAL at 08:20

## 2023-08-21 NOTE — DISCHARGE INSTRUCTIONS
Take penicillin and metronidazole as prescribed  Rinse out mouth with warm salt water after meals  Keep drain in place and dry  Soft diet  Activity as tolerated

## 2023-08-21 NOTE — PLAN OF CARE
Goal Outcome Evaluation:  Plan of Care Reviewed With: patient           Outcome Evaluation: A&OX4. VSS stable. POC- pain management, IVF, IVABX. No shift events.

## 2023-08-21 NOTE — PROGRESS NOTES
Name: Willian Pires ADMIT: 2023   : 2000  PCP: Provider, No Known    MRN: [id number] LOS: 0 days   AGE/SEX: 22 y.o. male  ROOM: [facility]     Subjective   Subjective   Patient appears comfortable and in no apparent distress.  Tolerating diet.  Voices no new concerns.       Objective   Objective   Vital Signs  Temp:  [96.5 øF (35.8 øC)-98.1 øF (36.7 øC)] 97.6 øF (36.4 øC)  Heart Rate:  [62-95] 95  Resp:  [12-20] 16  BP: (103-136)/(52-76) 130/67  SpO2:  [93 %-99 %] 98 %  on  Flow (L/min):  [2] 2;   Device (Oxygen Therapy): room air  Body mass index is 23.32 kg/mý.    Physical Exam  Constitutional:       General: He is not in acute distress.     Appearance: He is not toxic-appearing.   Cardiovascular:      Rate and Rhythm: Normal rate.      Heart sounds: Normal heart sounds.   Pulmonary:      Effort: Pulmonary effort is normal.      Breath sounds: Normal breath sounds.   Abdominal:      General: Bowel sounds are normal.      Palpations: Abdomen is soft.   Musculoskeletal:      Right lower leg: No edema.      Left lower leg: No edema.   Skin:     General: Skin is warm and dry.   Neurological:      Mental Status: He is alert.     Results Review     I reviewed the patient's new clinical results.  Results from last 7 days   Lab Units 23  0623 23  0643 23   WBC 10*3/mm3 6.57 10.88* 9.22   HEMOGLOBIN g/dL 13.3 14.3 14.5   PLATELETS 10*3/mm3 212 214 224     Results from last 7 days   Lab Units 23  0622 23  0643 23   SODIUM mmol/L 136 139 141   POTASSIUM mmol/L 4.1 4.3 3.9   CHLORIDE mmol/L 103 102 101   CO2 mmol/L 23.6 26.0 30.0*   BUN mg/dL 9 9 10   CREATININE mg/dL 0.74* 0.74* 0.83   GLUCOSE mg/dL 92 102* 103*   EGFR mL/min/1.73 131.4 131.4 126.9     Results from last 7 days   Lab Units 23  195   ALBUMIN g/dL 4.4   BILIRUBIN mg/dL 0.4   ALK PHOS U/L 79   AST (SGOT) U/L 16   ALT (SGPT) U/L 15     Results from last 7 days   Lab Units 23  0622  08/20/23  0643 08/19/23 1951   CALCIUM mg/dL 9.0 9.2 9.7   ALBUMIN g/dL  --   --  4.4     Results from last 7 days   Lab Units 08/19/23 1951   LACTATE mmol/L 0.8     No results found for: HGBA1C, POCGLU    CT Soft Tissue Neck With Contrast    Result Date: 8/19/2023  Patient is status post bilateral wisdom tooth extraction. There is asymmetric inflammatory stranding seen overlying the right side of the mandible, as well as asymmetric edema and enlargement of the floor of the mouth. Right submandibular gland also appears enlarged when compared to the contralateral side. No discrete drainable abscess is seen. There is some fullness within the right parapharyngeal space, but the airway appears intact.  Radiation dose reduction techniques were utilized, including automated exposure control and exposure modulation based on body size.   This report was finalized on 8/19/2023 9:27 PM by Dr. Saniya Carrillo M.D.       I have personally reviewed all medications:  Scheduled Medications  ampicillin-sulbactam, 3 g, Intravenous, Q6H  pantoprazole, 40 mg, Intravenous, Q AM  senna-docusate sodium, 2 tablet, Oral, BID  sodium chloride, 10 mL, Intravenous, Q12H    Infusions  lactated ringers, 9 mL/hr, Last Rate: 125 mL/hr (08/20/23 1323)    Diet  Diet: Liquid Diets, Diabetic Diets; Full Liquid; Consistent Carbohydrate; Texture: Regular Texture (IDDSI 7); Fluid Consistency: Thin (IDDSI 0)    I have personally reviewed:  [x]  Laboratory   [x]  Microbiology   []  Radiology   []  EKG/Telemetry  []  Cardiology/Vascular   []  Pathology    []  Records       Assessment/Plan     Active Hospital Problems    Diagnosis  POA    **Right facial swelling [R22.0]  Yes    Oral pain [K13.79]  Yes    S/P wisdom tooth extraction [Z98.818]  Not Applicable      Resolved Hospital Problems   No resolved problems to display.       22 y.o. male admitted with Right facial swelling.    Oral surgery performed I&D of right submandibular space and extraction  of tooth on 8/20/2023 for right submandibular space infection.  Penrose drain in place per oral surgery with outpatient follow-up for drain management.  Tolerating intake at present.  Anticipate discharge with oral formulation antibiotic pending final antibiotic plan / final results of anaerobic cultures.  BC x2 NGTD.    Infectious disease following plan continue IV Unasyn pending anaerobic culture results--hopeful available tomorrow.      Ambulating without complications  for DVT prophylaxis.  Full code.  Discussed with patient and nursing staff.  Anticipate discharge home with family when cleared by consultants.--ID regarding final antibiotic plan      HALEY Du  [facility]  08/21/23  14:20 EDT

## 2023-08-21 NOTE — PROGRESS NOTES
Pt feeling better, pain controlled with meds  AF,VSS, positive UOP, beginning to take PO  Exam: mild drain output, trismus with ASH 25mm, no bleeding  Gram stain shows Gram positive cocci, no growth yet  A/P progressing as expected s/p I and D right submandibular space  Encouraged PO intake and ambulation  Continue abx  If pt improves PO intake and continues to be afebrile could be discharged later today on PO antibiotics

## 2023-08-21 NOTE — PLAN OF CARE
Goal Outcome Evaluation:           Progress: improving  Outcome Evaluation: Patient complains of intermittent oral pain, treated with PRN pain medication. First dose of percocet today, PO med treating pain effectively. Administered x1 IV zofran for nausea and x1 IV toradol for pain and swelling this shift. Oral surgery okay with discharge. Awaiting cultures per ID for correct abx Rx. Continued today on IV abx with no s/s of reaction. Patient remains afebrile. Ambulates in room independently.

## 2023-08-22 VITALS
WEIGHT: 172.4 LBS | RESPIRATION RATE: 16 BRPM | BODY MASS INDEX: 23.35 KG/M2 | DIASTOLIC BLOOD PRESSURE: 64 MMHG | SYSTOLIC BLOOD PRESSURE: 112 MMHG | OXYGEN SATURATION: 98 % | HEART RATE: 55 BPM | HEIGHT: 72 IN | TEMPERATURE: 97 F

## 2023-08-22 PROCEDURE — 25010000002 AMPICILLIN-SULBACTAM PER 1.5 G: Performed by: DENTIST

## 2023-08-22 PROCEDURE — 25010000002 KETOROLAC TROMETHAMINE PER 15 MG: Performed by: DENTIST

## 2023-08-22 PROCEDURE — 99214 OFFICE O/P EST MOD 30 MIN: CPT | Performed by: INTERNAL MEDICINE

## 2023-08-22 RX ORDER — METRONIDAZOLE 500 MG/1
500 TABLET ORAL 3 TIMES DAILY
Qty: 21 TABLET | Refills: 0 | Status: SHIPPED | OUTPATIENT
Start: 2023-08-22 | End: 2023-08-29

## 2023-08-22 RX ORDER — PENICILLIN V POTASSIUM 500 MG/1
500 TABLET ORAL 4 TIMES DAILY
Qty: 28 TABLET | Refills: 0 | Status: SHIPPED | OUTPATIENT
Start: 2023-08-22 | End: 2023-08-29

## 2023-08-22 RX ADMIN — PANTOPRAZOLE SODIUM 40 MG: 40 INJECTION, POWDER, FOR SOLUTION INTRAVENOUS at 06:04

## 2023-08-22 RX ADMIN — KETOROLAC TROMETHAMINE 30 MG: 30 INJECTION, SOLUTION INTRAMUSCULAR; INTRAVENOUS at 06:04

## 2023-08-22 RX ADMIN — AMPICILLIN SODIUM AND SULBACTAM SODIUM 3 G: 2; 1 INJECTION, POWDER, FOR SOLUTION INTRAMUSCULAR; INTRAVENOUS at 02:30

## 2023-08-22 RX ADMIN — Medication 10 ML: at 08:40

## 2023-08-22 RX ADMIN — AMPICILLIN SODIUM AND SULBACTAM SODIUM 3 G: 2; 1 INJECTION, POWDER, FOR SOLUTION INTRAMUSCULAR; INTRAVENOUS at 08:40

## 2023-08-22 NOTE — PAYOR COMM NOTE
"Willian Pires (22 y.o. Male)          INPATIENT REQUEST FOR MEMBER [id number].  THE MEMBER WAS ADMITTED AS OBS 8/19 AND CONVERTED TO INPATIENT 8/22/23.  DX - Right facial swelling [R22.0]    [facility], NPI - [id number]  ATTENDING MD JOHNATHAN ELENA, NPI, [id number]    CONTACT OLYA ORTA  P# [phone]  F# [phone]             Date of Birth   2000    Social Security Number       Address   [address] [place] [address]    Home Phone   [phone]    MRN   [id number]       Rastafari   None    Marital Status   Single                            Admission Date   8/19/23    Admission Type   Emergency    Admitting Provider   Christian Wang MD    Attending Provider   Johnathan Elena MD    Department, Room/Bed   [address], 82/1       Discharge Date       Discharge Disposition   Home or Self Care    Discharge Destination                                 Attending Provider: Johnathan Elena MD    Allergies: No Known Allergies    Isolation: None   Infection: None   Code Status: CPR    Ht: 182.9 cm (72\")   Wt: 78.2 kg (172 lb 6.4 oz)    Admission Cmt: None   Principal Problem: Right facial swelling [R22.0]                   Active Insurance as of 8/19/2023       Primary Coverage       Payor Plan Insurance Group Employer/Plan Group    [id number]       Payor Plan Address Payor Plan Phone Number Payor Plan Fax Number Effective Dates    [address] [address] [phone]  1/1/2021 - None Entered    [facility] [id number]         Subscriber Name Subscriber Birth Date Member ID       WILLIAN PIRES 2000 [id number]                     Emergency Contacts        (Rel.) Home Phone Work Phone Mobile Phone    INEZYAAKOVARACELIS (Significant Other) -- -- [phone]                 History & Physical        Merline Ayala, APRN at 08/19/23 2223              Patient Name:  Willian Pires  YOB: 2000  MRN:  [id number]  Admit Date:  " 8/19/2023  Patient Care Team:  Provider, No Known as PCP - General      Subjective  History Present Illness     Chief Complaint   Patient presents with    Oral Swelling     History of Present Illness  Mr. Pires  is a 22 year old male with no medical history who presents to [facility] today with complaints of  increased swelling of the right side of his face, mandible, and under right side of his tongue. He does report having four wisdom teeth removed last Tuesday. He states he was doing well until yesterday when he noticed increased swelling and pain. He was seen by his oral surgeon earlier today who changed his antibiotics to penicillin and Flagyl He states later he went home and took a nap  and awoke with severe pain and increased edema of the right side of his face that has spread under the right side of his tongue.  He states that he called on-call service for his oral surgeon who advised him to report to the emergency department.  He reports 7 out of 10 pain that is worse with swallowing, no alleviating factors.  Denies any other acute distress.  He does have significant edema to right  mandible, that is tender to touch.    Initial evaluation in the emergency department  Blood pressure 135/75, HR 55, RR 18, oxygen saturation 96% on room air, he is afebrile with a Tmax of 97.9.  CT of soft tissues of the neck remarkable for asymmetric inflammatory stranding seen overlying the right side of the mandible.  As well as asymmetric edema and enlargement of the floor of the mouth.  Right submandibular gland also appears enlarged when compared to contralateral side.  No identifiable abscess there is fullness within the right parapharyngeal space.  He was administered IV morphine, IV Dilaudid, ondansetron, and started on Unasyn.    He will be admitted to the hospitalist care for further evaluation and treatment of right mandible edema with oral surgery consulted.      Review of Systems   HENT:  Positive  for dental problem. Negative for sinus pressure, sinus pain, sore throat, trouble swallowing and voice change.    Eyes: Negative.    Respiratory: Negative.     Cardiovascular: Negative.    Gastrointestinal: Negative.    Endocrine: Negative.    Genitourinary: Negative.    Musculoskeletal: Negative.    Skin: Negative.    Allergic/Immunologic: Negative.    Neurological: Negative.    Psychiatric/Behavioral: Negative.     All other systems reviewed and are negative.     Personal History     History reviewed. No pertinent past medical history.  History reviewed. No pertinent surgical history.  History reviewed. No pertinent family history.  Social History     Tobacco Use    Smoking status: Never     Passive exposure: Never    Smokeless tobacco: Never   Vaping Use    Vaping Use: Every day    Start date: 7/19/2023    Substances: Nicotine    Devices: Pre-filled or refillable cartridge    Passive vaping exposure: Yes   Substance Use Topics    Alcohol use: Yes     Alcohol/week: 2.0 standard drinks     Types: 2 Drinks containing 0.5 oz of alcohol per week    Drug use: Defer     Frequency: 7.0 times per week     Types: Marijuana     No current facility-administered medications on file prior to encounter.     No current outpatient medications on file prior to encounter.     No Known Allergies    Objective   Objective     Vital Signs  Temp:  [97.9 øF (36.6 øC)-98.3 øF (36.8 øC)] 98.3 øF (36.8 øC)  Heart Rate:  [] 55  Resp:  [18] 18  BP: (121-137)/(67-81) 135/75  SpO2:  [96 %-97 %] 97 %  on   ;      Body mass index is 23.06 kg/mý.    Physical Exam  Vitals and nursing note reviewed.   Constitutional:       General: He is awake.      Appearance: He is well-developed.   HENT:      Head: Atraumatic.      Jaw: Tenderness, swelling and pain on movement present.      Salivary Glands: Right salivary gland is diffusely enlarged and tender.      Mouth/Throat:      Mouth: Mucous membranes are dry.   Eyes:      Conjunctiva/sclera:  Conjunctivae normal.   Cardiovascular:      Rate and Rhythm: Normal rate and regular rhythm.      Pulses: Normal pulses.      Heart sounds: Normal heart sounds.   Pulmonary:      Effort: Pulmonary effort is normal.   Abdominal:      General: Bowel sounds are normal.      Palpations: Abdomen is soft.   Musculoskeletal:         General: Normal range of motion.      Cervical back: Normal range of motion.   Skin:     General: Skin is warm and dry.      Capillary Refill: Capillary refill takes less than 2 seconds.   Neurological:      General: No focal deficit present.      Mental Status: He is alert and oriented to person, place, and time.   Psychiatric:         Mood and Affect: Mood normal.         Behavior: Behavior is cooperative.       Results Review:  I reviewed the patient's new clinical results.  I reviewed the patient's new imaging results and agree with the interpretation.  I reviewed the patient's other test results and agree with the interpretation  I personally viewed and interpreted the patient's EKG/Telemetry data  Discussed with ED provider.    Lab Results (last 24 hours)       Procedure Component Value Units Date/Time    CBC & Differential [[id number]]  (Abnormal) Collected: 08/19/23 1951    Specimen: Blood Updated: 08/19/23 2003    Narrative:      The following orders were created for panel order CBC & Differential.  Procedure                               Abnormality         Status                     ---------                               -----------         ------                     CBC Auto Differential[[id number]]        Abnormal            Final result                 Please view results for these tests on the individual orders.    Comprehensive Metabolic Panel [[id number]]  (Abnormal) Collected: 08/19/23 1951    Specimen: Blood Updated: 08/19/23 2021     Glucose 103 mg/dL      BUN 10 mg/dL      Creatinine 0.83 mg/dL      Sodium 141 mmol/L      Potassium 3.9 mmol/L      Chloride 101 mmol/L      CO2  30.0 mmol/L      Calcium 9.7 mg/dL      Total Protein 6.9 g/dL      Albumin 4.4 g/dL      ALT (SGPT) 15 U/L      AST (SGOT) 16 U/L      Alkaline Phosphatase 79 U/L      Total Bilirubin 0.4 mg/dL      Globulin 2.5 gm/dL      A/G Ratio 1.8 g/dL      BUN/Creatinine Ratio 12.0     Anion Gap 10.0 mmol/L      eGFR 126.9 mL/min/1.73     Narrative:      GFR Normal >60  Chronic Kidney Disease <60  Kidney Failure <15      CBC Auto Differential [[id number]]  (Abnormal) Collected: 08/19/23 1951    Specimen: Blood Updated: 08/19/23 2003     WBC 9.22 10*3/mm3      RBC 5.11 10*6/mm3      Hemoglobin 14.5 g/dL      Hematocrit 42.0 %      MCV 82.2 fL      MCH 28.4 pg      MCHC 34.5 g/dL      RDW 12.3 %      RDW-SD 36.8 fl      MPV 9.7 fL      Platelets 224 10*3/mm3      Neutrophil % 73.9 %      Lymphocyte % 15.9 %      Monocyte % 9.5 %      Eosinophil % 0.5 %      Basophil % 0.1 %      Immature Grans % 0.1 %      Neutrophils, Absolute 6.80 10*3/mm3      Lymphocytes, Absolute 1.47 10*3/mm3      Monocytes, Absolute 0.88 10*3/mm3      Eosinophils, Absolute 0.05 10*3/mm3      Basophils, Absolute 0.01 10*3/mm3      Immature Grans, Absolute 0.01 10*3/mm3      nRBC 0.0 /100 WBC     Lactic Acid, Plasma [[id number]]  (Normal) Collected: 08/19/23 1951    Specimen: Blood Updated: 08/19/23 2019     Lactate 0.8 mmol/L     Blood Culture - Blood, Arm, Left [[id number]] Collected: 08/19/23 1951    Specimen: Blood from Arm, Left Updated: 08/19/23 2003    Blood Culture - Blood, Arm, Right [[id number]] Collected: 08/19/23 2004    Specimen: Blood from Arm, Right Updated: 08/19/23 2009            Imaging Results (Last 24 Hours)       Procedure Component Value Units Date/Time    CT Soft Tissue Neck With Contrast [[id number]] Collected: 08/19/23 2112     Updated: 08/19/23 2130    Narrative:      CT NECK SOFT TISSUE WITH CONTRAST     HISTORY: Postoperative swelling under the right jaw     COMPARISON: None available.     TECHNIQUE: Axial CT imaging was obtained  through the neck. IV contrast  was administered. Coronal and sagittal reformatted images were obtained.     FINDINGS:  Visualized portions of the brain parenchyma appear unremarkable. There  is no evidence of venous occlusion. Orbits appear unremarkable. Mucous  retention cyst is noted within the right ethmoid sinus. Mastoid air  cells are clear. Patient does appear to be status post bilateral wisdom  tooth extraction. There is some asymmetric soft tissue stranding seen  overlying the right mandible. No discrete drainable fluid collection is  seen. The nasopharynx is normal. Right parapharyngeal soft tissues may  be slightly thickened when compared to the contralateral side. There is  some asymmetric enlargement of the right submandibular gland. Right  mylohyoid muscle also appears thickened and edematous when compared to  the contralateral side. Epiglottis appears normal. Vocal cords also  appear normal. There is some subtle asymmetric enlargement of the  inferior right sternocleidomastoid, with some surrounding edema. Thyroid  gland and trachea are unremarkable. Images through the lung bases are  clear. Prominent right cervical lymph nodes are likely reactive. There  is no prevertebral edema.          Impression:      Patient is status post bilateral wisdom tooth extraction. There is  asymmetric inflammatory stranding seen overlying the right side of the  mandible, as well as asymmetric edema and enlargement of the floor of  the mouth. Right submandibular gland also appears enlarged when compared  to the contralateral side. No discrete drainable abscess is seen. There  is some fullness within the right parapharyngeal space, but the airway  appears intact.     Radiation dose reduction techniques were utilized, including automated  exposure control and exposure modulation based on body size.        This report was finalized on 8/19/2023 9:27 PM by Dr. Saniya Carrillo M.D.                   No orders to display         Assessment/Plan     Active Hospital Problems    Diagnosis  POA    **Right facial swelling [R22.0]  Yes    Oral pain [K13.79]  Yes    S/P wisdom tooth extraction [Z98.818]  Not Applicable      Resolved Hospital Problems   No resolved problems to display.     Right facial swelling  Oral pain  S/P wisdom tooth extraction  Oral surgery consulted  Continue IV hydromorphone for pain management  Continue Unasyn for possible infectious process  N.p.o. after midnight pending oral surgery evaluation  Clear liquid diet  Vital signs per policy        VTE Prophylaxis - SCDs.  Code Status - Full code.       HALEY Ordaz  [facility]  08/20/23  00:20 EDT  Patient urinated prior to midnight, delay in documentation due to patient care.     Electronically signed by Merline Ayala APRN at 08/20/23 0022          Emergency Department Notes        Roberta Flores, RN at 08/19/23 2221          Nursing report ED to floor  Willian Linker  22 y.o.  male    HPI :   Chief Complaint   Patient presents with    Oral Swelling       Admitting doctor:   Christian Wang MD    Admitting diagnosis:   The primary encounter diagnosis was Right facial swelling. A diagnosis of S/P wisdom tooth extraction was also pertinent to this visit.    Code status:   Current Code Status       Date Active Code Status Order ID Comments User Context       Not on file            Allergies:   Patient has no known allergies.    Isolation:   No active isolations    Intake and Output  No intake or output data in the 24 hours ending 08/19/23 2221    Weight:       08/19/23  1909   Weight: 77.1 kg (170 lb)       Most recent vitals:   Vitals:    08/19/23 2031 08/19/23 2101 08/19/23 2115 08/19/23 2201   BP: 121/80 129/71  128/67   Pulse: 67 68 64    Resp:       Temp:       SpO2: 97% 96% 97%    Weight:       Height:           Active LDAs/IV Access:   Lines, Drains & Airways       Active LDAs       Name Placement date Placement time Site  Days    Peripheral IV 08/19/23 1947 Left Antecubital 08/19/23 1947  Antecubital  less than 1                    Labs (abnormal labs have a star):   Labs Reviewed   COMPREHENSIVE METABOLIC PANEL - Abnormal; Notable for the following components:       Result Value    Glucose 103 (*)     CO2 30.0 (*)     All other components within normal limits    Narrative:     GFR Normal >60  Chronic Kidney Disease <60  Kidney Failure <15     CBC WITH AUTO DIFFERENTIAL - Abnormal; Notable for the following components:    RDW-SD 36.8 (*)     Lymphocyte % 15.9 (*)     All other components within normal limits   LACTIC ACID, PLASMA - Normal   BLOOD CULTURE   BLOOD CULTURE   CBC AND DIFFERENTIAL    Narrative:     The following orders were created for panel order CBC & Differential.  Procedure                               Abnormality         Status                     ---------                               -----------         ------                     CBC Auto Differential[[id number]]        Abnormal            Final result                 Please view results for these tests on the individual orders.       EKG:   No orders to display       Meds given in ED:   Medications   sodium chloride 0.9 % flush 10 mL (has no administration in time range)   morphine injection 2 mg (2 mg Intravenous Given 8/19/23 1952)   ondansetron (ZOFRAN) injection 4 mg (4 mg Intravenous Given 8/19/23 1952)   ampicillin-sulbactam (UNASYN) 3 g in sodium chloride 0.9 % 100 mL IVPB-VTB (0 g Intravenous Stopped 8/19/23 2045)   iopamidol (ISOVUE-300) 61 % injection 100 mL (75 mL Intravenous Given 8/19/23 2036)   HYDROmorphone (DILAUDID) injection 0.25 mg (0.25 mg Intravenous Given 8/19/23 2139)       Imaging results:  CT Soft Tissue Neck With Contrast    Result Date: 8/19/2023  Patient is status post bilateral wisdom tooth extraction. There is asymmetric inflammatory stranding seen overlying the right side of the mandible, as well as asymmetric edema and enlargement  of the floor of the mouth. Right submandibular gland also appears enlarged when compared to the contralateral side. No discrete drainable abscess is seen. There is some fullness within the right parapharyngeal space, but the airway appears intact.  Radiation dose reduction techniques were utilized, including automated exposure control and exposure modulation based on body size.   This report was finalized on 8/19/2023 9:27 PM by Dr. Saniya Carrillo M.D.       Ambulatory status:   - up ad mey    Social issues:   Social History     Socioeconomic History    Marital status: Single       NIH Stroke Scale:       Roberta Flores RN  08/19/23 22:21 EDT         Electronically signed by Roberta Flores RN at 08/19/23 2221       Roberta Flores RN at 08/19/23 2114          Pt appears very uncomfortable. Both providers aware.    Electronically signed by Roberta Flores RN at 08/19/23 2115       Christian Mosley PA at 08/19/23 2047       Attestation signed by Mor Snider MD at 08/20/23 0012        SHARED APC FACE TO FACE: This visit was performed by both the physician and an APC. I personally evaluated and examined the patient. I performed the entirety of the physical exam and I documented this exam in this attestation or in accompanying documentation.    Mor Snider MD 8/20/2023 00:12 EDT                          EMERGENCY DEPARTMENT ENCOUNTER    Room Number:  04/04  Date of encounter:  8/19/2023  PCP: Provider, No Known  Patient Care Team:  Provider, No Known as PCP - General   Independent Historians: Patient    HPI:  Chief Complaint: Facial pain  A complete HPI/ROS/PMH/PSH/SH/FH are unobtainable due to: N/A    Chronic or social conditions impacting patient care (social determinants of health): None    Context: Willian Pires is a 22 y.o. male with no significant past medical history who arrives to the ED with complaint of right-sided facial pain and swelling.  Patient states that he had his wisdom teeth  removed on Tuesday and then on Friday he started to develop worsening pain and swelling to the right side of his jaw that was spreading below his mandible.  Patient states that he has had some muffled voice, difficulty opening his mouth, and the pain is worse with swallowing.  Denies any fever or chills, chest pain, or shortness of breath.    Review of prior external notes (non-ED): None    Review of prior external test results outside of this encounter: None    PAST MEDICAL HISTORY  Active Ambulatory Problems     Diagnosis Date Noted    No Active Ambulatory Problems     Resolved Ambulatory Problems     Diagnosis Date Noted    No Resolved Ambulatory Problems     No Additional Past Medical History       The patient has started, but not completed, their COVID-19 vaccination series.    PAST SURGICAL HISTORY  No past surgical history on file.      FAMILY HISTORY  History reviewed. No pertinent family history.      SOCIAL HISTORY  Social History     Socioeconomic History    Marital status: Single         ALLERGIES  Patient has no known allergies.        REVIEW OF SYSTEMS  Review of Systems     All systems reviewed and negative except for those discussed in HPI.       PHYSICAL EXAM    I have reviewed the triage vital signs and nursing notes.    ED Triage Vitals [08/19/23 1907]   Temp Heart Rate Resp BP SpO2   97.9 øF (36.6 øC) 104 18 137/81 96 %      Temp src Heart Rate Source Patient Position BP Location FiO2 (%)   -- -- -- -- --       Physical Exam    GENERAL: alert and oriented x4, mildly distressed  HENT: normocephalic, atraumatic, moist mucous membranes, mild trismus, tense soft tissue swelling to the angle of the right mandible and inferior to the right mandible, no induration to the floor of the mouth  EYES: no scleral icterus, PERRL, EOMI  CV: regular rhythm, regular rate, intact distal perfusion  RESPIRATORY: normal effort, CTAB  MUSCULOSKELETAL: no deformity  NEURO: alert, moves all extremities, no focal neuro  deficits, follows commands  SKIN: warm, dry, no rash   Psych: Appropriate mood and affect      Nursing notes and vital signs reviewed      LAB RESULTS  Recent Results (from the past 24 hour(s))   Comprehensive Metabolic Panel    Collection Time: 08/19/23  7:51 PM    Specimen: Blood   Result Value Ref Range    Glucose 103 (H) 65 - 99 mg/dL    BUN 10 6 - 20 mg/dL    Creatinine 0.83 0.76 - 1.27 mg/dL    Sodium 141 136 - 145 mmol/L    Potassium 3.9 3.5 - 5.2 mmol/L    Chloride 101 98 - 107 mmol/L    CO2 30.0 (H) 22.0 - 29.0 mmol/L    Calcium 9.7 8.6 - 10.5 mg/dL    Total Protein 6.9 6.0 - 8.5 g/dL    Albumin 4.4 3.5 - 5.2 g/dL    ALT (SGPT) 15 1 - 41 U/L    AST (SGOT) 16 1 - 40 U/L    Alkaline Phosphatase 79 39 - 117 U/L    Total Bilirubin 0.4 0.0 - 1.2 mg/dL    Globulin 2.5 gm/dL    A/G Ratio 1.8 g/dL    BUN/Creatinine Ratio 12.0 7.0 - 25.0    Anion Gap 10.0 5.0 - 15.0 mmol/L    eGFR 126.9 >60.0 mL/min/1.73   CBC Auto Differential    Collection Time: 08/19/23  7:51 PM    Specimen: Blood   Result Value Ref Range    WBC 9.22 3.40 - 10.80 10*3/mm3    RBC 5.11 4.14 - 5.80 10*6/mm3    Hemoglobin 14.5 13.0 - 17.7 g/dL    Hematocrit 42.0 37.5 - 51.0 %    MCV 82.2 79.0 - 97.0 fL    MCH 28.4 26.6 - 33.0 pg    MCHC 34.5 31.5 - 35.7 g/dL    RDW 12.3 12.3 - 15.4 %    RDW-SD 36.8 (L) 37.0 - 54.0 fl    MPV 9.7 6.0 - 12.0 fL    Platelets 224 140 - 450 10*3/mm3    Neutrophil % 73.9 42.7 - 76.0 %    Lymphocyte % 15.9 (L) 19.6 - 45.3 %    Monocyte % 9.5 5.0 - 12.0 %    Eosinophil % 0.5 0.3 - 6.2 %    Basophil % 0.1 0.0 - 1.5 %    Immature Grans % 0.1 0.0 - 0.5 %    Neutrophils, Absolute 6.80 1.70 - 7.00 10*3/mm3    Lymphocytes, Absolute 1.47 0.70 - 3.10 10*3/mm3    Monocytes, Absolute 0.88 0.10 - 0.90 10*3/mm3    Eosinophils, Absolute 0.05 0.00 - 0.40 10*3/mm3    Basophils, Absolute 0.01 0.00 - 0.20 10*3/mm3    Immature Grans, Absolute 0.01 0.00 - 0.05 10*3/mm3    nRBC 0.0 0.0 - 0.2 /100 WBC   Lactic Acid, Plasma    Collection Time:  08/19/23  7:51 PM    Specimen: Blood   Result Value Ref Range    Lactate 0.8 0.5 - 2.0 mmol/L       Ordered the above labs and independently reviewed and interpreted the results by me.        RADIOLOGY  CT Soft Tissue Neck With Contrast    Result Date: 8/19/2023  CT NECK SOFT TISSUE WITH CONTRAST  HISTORY: Postoperative swelling under the right jaw  COMPARISON: None available.  TECHNIQUE: Axial CT imaging was obtained through the neck. IV contrast was administered. Coronal and sagittal reformatted images were obtained.  FINDINGS: Visualized portions of the brain parenchyma appear unremarkable. There is no evidence of venous occlusion. Orbits appear unremarkable. Mucous retention cyst is noted within the right ethmoid sinus. Mastoid air cells are clear. Patient does appear to be status post bilateral wisdom tooth extraction. There is some asymmetric soft tissue stranding seen overlying the right mandible. No discrete drainable fluid collection is seen. The nasopharynx is normal. Right parapharyngeal soft tissues may be slightly thickened when compared to the contralateral side. There is some asymmetric enlargement of the right submandibular gland. Right mylohyoid muscle also appears thickened and edematous when compared to the contralateral side. Epiglottis appears normal. Vocal cords also appear normal. There is some subtle asymmetric enlargement of the inferior right sternocleidomastoid, with some surrounding edema. Thyroid gland and trachea are unremarkable. Images through the lung bases are clear. Prominent right cervical lymph nodes are likely reactive. There is no prevertebral edema.       Patient is status post bilateral wisdom tooth extraction. There is asymmetric inflammatory stranding seen overlying the right side of the mandible, as well as asymmetric edema and enlargement of the floor of the mouth. Right submandibular gland also appears enlarged when compared to the contralateral side. No discrete  drainable abscess is seen. There is some fullness within the right parapharyngeal space, but the airway appears intact.  Radiation dose reduction techniques were utilized, including automated exposure control and exposure modulation based on body size.   This report was finalized on 8/19/2023 9:27 PM by Dr. Saniya Carrillo M.D.       I ordered the above noted radiological studies.  These were independently interpreted and reviewed by me.  See dictation for official radiology interpretation.      PROCEDURES    Procedures      MEDICATIONS GIVEN IN ER    Medications   sodium chloride 0.9 % flush 10 mL (has no administration in time range)   sodium chloride 0.9 % flush 10 mL (has no administration in time range)   sodium chloride 0.9 % flush 10 mL (has no administration in time range)   sodium chloride 0.9 % infusion 40 mL (has no administration in time range)   sennosides-docusate (PERICOLACE) 8.6-50 MG per tablet 2 tablet (has no administration in time range)     And   polyethylene glycol (MIRALAX) packet 17 g (has no administration in time range)     And   bisacodyl (DULCOLAX) EC tablet 5 mg (has no administration in time range)     And   bisacodyl (DULCOLAX) suppository 10 mg (has no administration in time range)   morphine injection 2 mg (2 mg Intravenous Given 8/19/23 1952)   ondansetron (ZOFRAN) injection 4 mg (4 mg Intravenous Given 8/19/23 1952)   ampicillin-sulbactam (UNASYN) 3 g in sodium chloride 0.9 % 100 mL IVPB-VTB (0 g Intravenous Stopped 8/19/23 2045)   iopamidol (ISOVUE-300) 61 % injection 100 mL (75 mL Intravenous Given 8/19/23 2036)   HYDROmorphone (DILAUDID) injection 0.25 mg (0.25 mg Intravenous Given 8/19/23 2139)         PROGRESS, DATA ANALYSIS, CONSULTS, AND MEDICAL DECISION MAKING    All labs have been independently reviewed by me.  All radiology studies have been reviewed by me and discussed with radiologist dictating the report.   EKG's independently viewed and interpreted by me.   Discussion below represents my analysis of pertinent findings related to patient's condition, differential diagnosis, treatment plan and final disposition.    DDx:  Includes, but is not limited to abscess, hematoma    After my initial assessment I am concerned about postsurgical complications and airway compromise and patient may need hospitalization due to OMFS consultation.    ED Course as of 08/19/23 2235   Sat Aug 19, 2023   1914 Oral surgeon: Stacey [TR]   2041 CT Soft Tissue Neck With Contrast  My independent interpretation of the CT of the neck with contrast is right submental swelling without focal fluid collection [TR]   2046 WBC: 9.22 [OSMAR]   2046 Hemoglobin: 14.5 [OSMAR]   2046 Hematocrit: 42.0 [OSMAR]   2046 Platelets: 224 [OSMAR]   2046 Glucose(!): 103 [OSMAR]   2046 BUN: 10 [OSMAR]   2046 Creatinine: 0.83 [OSMAR]   2046 Sodium: 141 [OSMAR]   2046 Potassium: 3.9 [OSMAR]   2046 Chloride: 101 [OSMAR]   2046 CO2(!): 30.0 [OSMAR]   2046 Lactate: 0.8 [OSMAR]   2202 Patient history, ER presentation as well as laboratory and CT findings discussed with Dr. Jiménez, oral surgery, will consult, requested medicine to admit. [OSMAR]   2210 Patient history, ER presentation and evaluation as well as discussions with oral maxillofacial surgery discussed with HALEY Tong, will place in observation to a [facility] bed per Dr. Wang. [OSMAR]   2219 Patient rechecked, discussed plan for admission for oral surgery consultation and continued antibiotics.  Patient expresses understanding and agrees with plan. [OSMAR]      ED Course User Index  [OSMAR] Christian Mosley PA  [TR] Mor Snider MD       MDM: Patient will be admitted for IV antibiotics and oral surgery consultation.    PPE:  The patient wore a mask and I wore a mask and all appropriate PPE throughout the entire patient encounter.      AS OF 22:35 EDT VITALS:    BP - 128/67  HR - 64  TEMP - 97.9 øF (36.6 øC)  O2 SATS - 97%      DIAGNOSIS  Final diagnoses:   Right facial swelling   S/P wisdom tooth  extraction         DISPOSITION  ADMISSION    Discussed treatment plan and reason for admission with pt/family and admitting physician.  Pt/family voiced understanding of the plan for admission for further testing/treatment as needed.          Christian Mosley PA  08/19/23 2235      Electronically signed by Mor Snider MD at 08/20/23 0012       Roberta Flores, RN at 08/19/23 2043          Dr. Jiménez called and spoke with this nurse. Dr. Jiménez updated on pt's status and treatment plan up to this point.    Electronically signed by Roberta Flores RN at 08/19/23 2044       Mor Snider MD at 08/19/23 1939          MD ATTESTATION NOTE    The LESTER and I have discussed this patient's history, physical exam, and treatment plan.  I have reviewed the documentation and personally had a face to face interaction with the patient. I affirm the documentation and agree with the treatment and plan.  The attached note describes my personal findings.    I provided a substantive portion of the care of this patient. I personally performed the physical exam, in its entirety.    Independent Historians: Patient, family    A complete HPI/ROS/PMH/PSH/SH/FH are unobtainable due to: Nothing    Chronic or social conditions impacting patient care (social determinants of health): None    Willian Pires is a 22 y.o. male who presents to the ED c/o acute face and jaw swelling.  The patient reports that on Tuesday he had some wisdom teeth removed.  He states that he was doing well until Friday.  He reports he talked to his oral surgeon yesterday and they added metronidazole to the penicillin he was already on.  They refilled his pain medicine.  He reports he woke up today with further swelling under his right jaw as well as pain with swallowing and some feeling of swelling under his tongue.  He reports he called his surgeon and they directed him to the emergency room.  He denies any fever.  He does report that he has had some  sweats.      Review of prior external notes (non-ED) -and- Review of prior external test results outside of this encounter: Hemoglobin A1c was 5.5 on 7/11/2022    Prescription drug monitoring program review:        On exam:  GENERAL: Awake, alert, no acute distress  SKIN: Warm, dry  HENT: Normocephalic, atraumatic.  Swelling under the right jaw.  This is firm.  There is no stridor or drooling.  He does have somewhat of a muffled voice.  He has mild trismus.  He has no abnormal tongue elevation.  EYES: no scleral icterus  CV: regular rhythm, regular rate  RESPIRATORY: normal effort, lungs clear  ABDOMEN: soft, nontender, nondistended  MUSCULOSKELETAL: no deformity  NEURO: alert, moves all extremities, follows commands                                                             Labs  Recent Results (from the past 24 hour(s))   Comprehensive Metabolic Panel    Collection Time: 08/19/23  7:51 PM    Specimen: Blood   Result Value Ref Range    Glucose 103 (H) 65 - 99 mg/dL    BUN 10 6 - 20 mg/dL    Creatinine 0.83 0.76 - 1.27 mg/dL    Sodium 141 136 - 145 mmol/L    Potassium 3.9 3.5 - 5.2 mmol/L    Chloride 101 98 - 107 mmol/L    CO2 30.0 (H) 22.0 - 29.0 mmol/L    Calcium 9.7 8.6 - 10.5 mg/dL    Total Protein 6.9 6.0 - 8.5 g/dL    Albumin 4.4 3.5 - 5.2 g/dL    ALT (SGPT) 15 1 - 41 U/L    AST (SGOT) 16 1 - 40 U/L    Alkaline Phosphatase 79 39 - 117 U/L    Total Bilirubin 0.4 0.0 - 1.2 mg/dL    Globulin 2.5 gm/dL    A/G Ratio 1.8 g/dL    BUN/Creatinine Ratio 12.0 7.0 - 25.0    Anion Gap 10.0 5.0 - 15.0 mmol/L    eGFR 126.9 >60.0 mL/min/1.73   CBC Auto Differential    Collection Time: 08/19/23  7:51 PM    Specimen: Blood   Result Value Ref Range    WBC 9.22 3.40 - 10.80 10*3/mm3    RBC 5.11 4.14 - 5.80 10*6/mm3    Hemoglobin 14.5 13.0 - 17.7 g/dL    Hematocrit 42.0 37.5 - 51.0 %    MCV 82.2 79.0 - 97.0 fL    MCH 28.4 26.6 - 33.0 pg    MCHC 34.5 31.5 - 35.7 g/dL    RDW 12.3 12.3 - 15.4 %    RDW-SD 36.8 (L) 37.0 - 54.0 fl     MPV 9.7 6.0 - 12.0 fL    Platelets 224 140 - 450 10*3/mm3    Neutrophil % 73.9 42.7 - 76.0 %    Lymphocyte % 15.9 (L) 19.6 - 45.3 %    Monocyte % 9.5 5.0 - 12.0 %    Eosinophil % 0.5 0.3 - 6.2 %    Basophil % 0.1 0.0 - 1.5 %    Immature Grans % 0.1 0.0 - 0.5 %    Neutrophils, Absolute 6.80 1.70 - 7.00 10*3/mm3    Lymphocytes, Absolute 1.47 0.70 - 3.10 10*3/mm3    Monocytes, Absolute 0.88 0.10 - 0.90 10*3/mm3    Eosinophils, Absolute 0.05 0.00 - 0.40 10*3/mm3    Basophils, Absolute 0.01 0.00 - 0.20 10*3/mm3    Immature Grans, Absolute 0.01 0.00 - 0.05 10*3/mm3    nRBC 0.0 0.0 - 0.2 /100 WBC   Lactic Acid, Plasma    Collection Time: 08/19/23  7:51 PM    Specimen: Blood   Result Value Ref Range    Lactate 0.8 0.5 - 2.0 mmol/L       Radiology  CT Soft Tissue Neck With Contrast    Result Date: 8/19/2023  CT NECK SOFT TISSUE WITH CONTRAST  HISTORY: Postoperative swelling under the right jaw  COMPARISON: None available.  TECHNIQUE: Axial CT imaging was obtained through the neck. IV contrast was administered. Coronal and sagittal reformatted images were obtained.  FINDINGS: Visualized portions of the brain parenchyma appear unremarkable. There is no evidence of venous occlusion. Orbits appear unremarkable. Mucous retention cyst is noted within the right ethmoid sinus. Mastoid air cells are clear. Patient does appear to be status post bilateral wisdom tooth extraction. There is some asymmetric soft tissue stranding seen overlying the right mandible. No discrete drainable fluid collection is seen. The nasopharynx is normal. Right parapharyngeal soft tissues may be slightly thickened when compared to the contralateral side. There is some asymmetric enlargement of the right submandibular gland. Right mylohyoid muscle also appears thickened and edematous when compared to the contralateral side. Epiglottis appears normal. Vocal cords also appear normal. There is some subtle asymmetric enlargement of the inferior right  sternocleidomastoid, with some surrounding edema. Thyroid gland and trachea are unremarkable. Images through the lung bases are clear. Prominent right cervical lymph nodes are likely reactive. There is no prevertebral edema.       Patient is status post bilateral wisdom tooth extraction. There is asymmetric inflammatory stranding seen overlying the right side of the mandible, as well as asymmetric edema and enlargement of the floor of the mouth. Right submandibular gland also appears enlarged when compared to the contralateral side. No discrete drainable abscess is seen. There is some fullness within the right parapharyngeal space, but the airway appears intact.  Radiation dose reduction techniques were utilized, including automated exposure control and exposure modulation based on body size.   This report was finalized on 8/19/2023 9:27 PM by Dr. Saniya Carrillo M.D.       Medical Decision Making:  ED Course as of 08/19/23 2314   Sat Aug 19, 2023   1914 Oral surgeon: Stacey [TR]   2041 CT Soft Tissue Neck With Contrast  My independent interpretation of the CT of the neck with contrast is right submental swelling without focal fluid collection [TR]   2046 WBC: 9.22 [OSMAR]   2046 Hemoglobin: 14.5 [OSMAR]   2046 Hematocrit: 42.0 [OSMAR]   2046 Platelets: 224 [OSMAR]   2046 Glucose(!): 103 [OSMAR]   2046 BUN: 10 [OSMAR]   2046 Creatinine: 0.83 [OSMAR]   2046 Sodium: 141 [OSMAR]   2046 Potassium: 3.9 [OSMAR]   2046 Chloride: 101 [OSMAR]   2046 CO2(!): 30.0 [OSMAR]   2046 Lactate: 0.8 [OSMAR]   2202 Patient history, ER presentation as well as laboratory and CT findings discussed with Dr. Jiménez, oral surgery, will consult, requested medicine to admit. [OSMAR]   2210 Patient history, ER presentation and evaluation as well as discussions with oral maxillofacial surgery discussed with ANDREWS Ayala, HALEY, will place in observation to a [facility] bed per Dr. Wang. [OSMAR]   2219 Patient rechecked, discussed plan for admission for oral surgery consultation and  continued antibiotics.  Patient expresses understanding and agrees with plan. [OSMAR]      ED Course User Index  [OSMAR] Christian Mosley PA  [TR] Mor Snider MD       The patient presents with focal swelling after jaw surgery.  Plan to obtain imaging of his neck looking for abscess or progressive infection.  I will give him morphine for pain.  I will give him Unasyn IV in the meantime.  My differential diagnosis includes jaw abscess, hematoma, Ludewig's angina, deep space neck infection, and others.    Procedures:  Procedures            Diagnosis  Final diagnoses:   Right facial swelling   S/P wisdom tooth extraction       Note Disclaimer: At [facility], we believe that sharing information builds trust and better relationships. You are receiving this note because you recently visited [facility]. It is possible you will see health information before a provider has talked with you about it. This kind of information can be easy to misunderstand. To help you fully understand what it means for your health, we urge you to discuss this note with your provider.       Mor Snider MD  08/19/23 2217       Mor Snider MD  08/19/23 2315      Electronically signed by Mor Snider MD at 08/19/23 2315       Skye Amezcua, RN at 08/19/23 1905          Patient from home via PV reporting oral swelling that started this morning. Patient had wisdom teeth extracted on 8/15. Patient's voice is muffled, denies difficulty breathing. Patient states he is able to swallow secretions.     Electronically signed by Skye Amezcua, RN at 08/19/23 1912          Operative/Procedure Notes (last 72 hours)        Richar Jiménez DMD at 08/20/23 1303          TOOTH EXTRACTION  Progress Note    Willian Pires  8/20/2023    Pre-op Diagnosis:   Right submandibular space abscess    Post-Op Diagnosis Codes:   same    Procedure/CPTr Codes:        Procedure(s):  I and D right submandibular space,  extraction            Surgeon(s):  Richar Jiménez DMD    Anesthesia: General    Staff:   Circulator: Todd Maynard RN  Scrub Person: Rafi Chairez; Neetu Napier         Estimated Blood Loss: minimal    Urine Voided: * No values recorded between 8/20/2023 12:47 PM and 8/20/2023  1:21 PM *    Specimens:                Specimens       ID Source Type Tests Collected By Collected At Frozen?    1 Oral Cavity Surgical Site ANAEROBIC CULTURE  GRAM STAIN - NO CULTURE   Richar Jiménez DMD 8/20/23 1304     Description: aerobic, anerobic, gram stain for sensitivity    Comment: aerobic, anerobic, gram stain for sensitivity                  Drains:   Open Drain Inferior;Right  (Active)       Findings: abscess right submandibular space        Complications: none          Richar Jiménez DMD     Date: 8/20/2023  Time: 13:25 EDT          Electronically signed by Richar Jiménez DMD at 08/20/23 1326       Richar Jiménez DMD at 08/20/23 1303          PREOPERATIVE DIAGNOSIS:  Right submandibular space infection.     POSTOPERATIVE DIAGNOSIS:  Right submandibular space infection.     PROCEDURE PERFORMED:  Incision and drainage of right submandibular space and extraction of tooth.     SURGEON:  Richar Jiménez DMD     ANESTHESIA:  General.     ESTIMATED BLOOD LOSS:  Minimal.      SPECIMENS:  Aspirate from submandibular space.      DRAINS:  One Penrose in the submandibular space.      INDICATIONS/CONSENT:  This is a 22-year-old male who earlier in the week had teeth extracted for dental caries and pericoronitis.  He progressed well through the week however yesterday he began developing progressive swelling of the right submandibular area and difficulty swallowing despite being on oral penicillin.  He presented to the emergency room and after clinical and radiographic evaluation it was felt that he would benefit from admission, IV antibiotic therapy, and incision and drainage.  Tooth #31 which had been planned for restorative care  by his dentist had caries and as this is the only problematic area which remained, and the extraction site from tooth #32 from earlier in the week did not appear to be infected, it was felt that he would benefit from removal of this tooth as the potential source of the infection.  The procedure, potential risks and complications were explained to the patient and both verbal and written consent were obtained.       DESCRIPTION OF PROCEDURE:  The patient was taken to the operating room and placed in the supine position on the operating room table.  A state of general anesthesia was induced and an oral endotracheal tube was placed.  The right mandible was infiltrated with 1% lidocaine with 1:100,000 epinephrine.  A throat pack was placed.  The patient was prepped and draped in a sterile fashion.  Attention was first directed extra-orally where an incision was made below the inferior border of the mandible approximately 1 cm below and anterior to the antegonial notch through skin and subcutaneous tissue approximately 7 mm in length.  Then, using a hemostat blunt dissection was carried through the superficial fascia and superficial layer of the deep cervical fascia into the right submandibular space where purulent discharge was encountered.  An aspirate was sent, for Gram stain culture and sensitivity, to microbiology.  Once the right submandibular space had been thoroughly explored with a hemostat a Penrose drain was placed into the space and secured to the skin using a 3-0 silk suture in interrupted fashion.  Attention was then directed intra-orally where tooth #31 was extracted by reflecting full-thickness mucoperiosteal flap, removing the tooth and smoothing irregular bone.  The socket of tooth #32 was thoroughly explored during this time too and no pus or loose bone was observed.  The wound was thoroughly irrigated and the oral mucosa closed using 3-0 chromic gut in an interrupted fashion.  Dry dressings were  placed.  The throat pack was removed. The oropharynx was thoroughly suctioned.  The patient was awakened, extubated and taken to the recovery room.  There were no complications.  All counts were correct x3.          Electronically signed by Richar Jiménez DMD at 08/21/23 0808          Physician Progress Notes (last 72 hours)        Ria Boston MD at 08/22/23 1023           LOS: 0 days     Chief Complaint: Submandibular infection    Interval History: Afebrile, decreased swelling.  Patient states he feels well.  Tolerating antibiotics without abdominal pain nausea vomiting or diarrhea.  No rash    Vital Signs  Temp:  [96.9 øF (36.1 øC)-97.3 øF (36.3 øC)] 97 øF (36.1 øC)  Heart Rate:  [55-67] 55  Resp:  [16-18] 16  BP: (112-124)/(60-73) 112/64    Physical Exam:  General: In no acute distress  HEENT: Right facial swelling has decreased  Cardiovascular: RRR, no lower extremity edema  Respiratory: Breathing comfortably on room air  GI: Soft, NT/ND, + bowel sounds bilaterally  Skin: No rashes     Antibiotics:  Unasyn 3 g IV every 6 hours     Results Review:    Lab Results   Component Value Date    WBC 6.57 08/21/2023    HGB 13.3 08/21/2023    HCT 37.9 08/21/2023    MCV 82.6 08/21/2023     08/21/2023     Lab Results   Component Value Date    GLUCOSE 92 08/21/2023    BUN 9 08/21/2023    CREATININE 0.74 (L) 08/21/2023    BCR 12.2 08/21/2023    CO2 23.6 08/21/2023    CALCIUM 9.0 08/21/2023    ALBUMIN 4.4 08/19/2023    LABIL2 1.6 07/29/2018    AST 16 08/19/2023    ALT 15 08/19/2023       Microbiology:  8/20 OR cx and positive cocci.  8/19 BCx NGTD x 2    Assessment & Plan   Right facial swelling in the setting of recent wisdom tooth extraction status post incision and drainage with dental extraction on August 20     Discussed with microbiology cultures with most likely 2 different species of Streptococcus.  I will follow-up identification and sensitivities but in the meantime can discharge the patient on penicillin  500 mg p.o. every 6 hours and Flagyl 500 mg p.o. 3 times daily x7 days.  Patient states these are the antibiotics he already has at home.  Follow-up with oral surgery per their recommendations.      Electronically signed by Ria Boston MD at 23 1209       Lexa Jackson APRN at 23 0905              Name: Willian Pires ADMIT: 2023   : 2000  PCP: Provider, No Known    MRN: [id number] LOS: 0 days   AGE/SEX: 22 y.o. male  ROOM: [facility]     Subjective   Subjective   Patient appears comfortable and in no apparent distress.  Tolerating diet.  Voices no new concerns.      Objective   Objective   Vital Signs  Temp:  [96.5 øF (35.8 øC)-98.1 øF (36.7 øC)] 97.6 øF (36.4 øC)  Heart Rate:  [62-95] 95  Resp:  [12-20] 16  BP: (103-136)/(52-76) 130/67  SpO2:  [93 %-99 %] 98 %  on  Flow (L/min):  [2] 2;   Device (Oxygen Therapy): room air  Body mass index is 23.32 kg/mý.    Physical Exam  Constitutional:       General: He is not in acute distress.     Appearance: He is not toxic-appearing.   Cardiovascular:      Rate and Rhythm: Normal rate.      Heart sounds: Normal heart sounds.   Pulmonary:      Effort: Pulmonary effort is normal.      Breath sounds: Normal breath sounds.   Abdominal:      General: Bowel sounds are normal.      Palpations: Abdomen is soft.   Musculoskeletal:      Right lower leg: No edema.      Left lower leg: No edema.   Skin:     General: Skin is warm and dry.   Neurological:      Mental Status: He is alert.     Results Review     I reviewed the patient's new clinical results.  Results from last 7 days   Lab Units 23  0623 23  0643 23   WBC 10*3/mm3 6.57 10.88* 9.22   HEMOGLOBIN g/dL 13.3 14.3 14.5   PLATELETS 10*3/mm3 212 214 224     Results from last 7 days   Lab Units 23  0622 23  0643 23   SODIUM mmol/L 136 139 141   POTASSIUM mmol/L 4.1 4.3 3.9   CHLORIDE mmol/L 103 102 101   CO2 mmol/L 23.6 26.0 30.0*   BUN mg/dL 9 9 10   CREATININE  mg/dL 0.74* 0.74* 0.83   GLUCOSE mg/dL 92 102* 103*   EGFR mL/min/1.73 131.4 131.4 126.9     Results from last 7 days   Lab Units 08/19/23 1951   ALBUMIN g/dL 4.4   BILIRUBIN mg/dL 0.4   ALK PHOS U/L 79   AST (SGOT) U/L 16   ALT (SGPT) U/L 15     Results from last 7 days   Lab Units 08/21/23  0622 08/20/23  0643 08/19/23 1951   CALCIUM mg/dL 9.0 9.2 9.7   ALBUMIN g/dL  --   --  4.4     Results from last 7 days   Lab Units 08/19/23 1951   LACTATE mmol/L 0.8     No results found for: HGBA1C, POCGLU    CT Soft Tissue Neck With Contrast    Result Date: 8/19/2023  Patient is status post bilateral wisdom tooth extraction. There is asymmetric inflammatory stranding seen overlying the right side of the mandible, as well as asymmetric edema and enlargement of the floor of the mouth. Right submandibular gland also appears enlarged when compared to the contralateral side. No discrete drainable abscess is seen. There is some fullness within the right parapharyngeal space, but the airway appears intact.  Radiation dose reduction techniques were utilized, including automated exposure control and exposure modulation based on body size.   This report was finalized on 8/19/2023 9:27 PM by Dr. Saniya Carrillo M.D.       I have personally reviewed all medications:  Scheduled Medications  ampicillin-sulbactam, 3 g, Intravenous, Q6H  pantoprazole, 40 mg, Intravenous, Q AM  senna-docusate sodium, 2 tablet, Oral, BID  sodium chloride, 10 mL, Intravenous, Q12H    Infusions  lactated ringers, 9 mL/hr, Last Rate: 125 mL/hr (08/20/23 1323)    Diet  Diet: Liquid Diets, Diabetic Diets; Full Liquid; Consistent Carbohydrate; Texture: Regular Texture (IDDSI 7); Fluid Consistency: Thin (IDDSI 0)    I have personally reviewed:  [x]  Laboratory   [x]  Microbiology   []  Radiology   []  EKG/Telemetry  []  Cardiology/Vascular   []  Pathology    []  Records      Assessment/Plan     Active Hospital Problems    Diagnosis  POA    **Right facial  swelling [R22.0]  Yes    Oral pain [K13.79]  Yes    S/P wisdom tooth extraction [Z98.818]  Not Applicable      Resolved Hospital Problems   No resolved problems to display.       22 y.o. male admitted with Right facial swelling.    Oral surgery performed I&D of right submandibular space and extraction of tooth on 8/20/2023 for right submandibular space infection.  Penrose drain in place per oral surgery with outpatient follow-up for drain management.  Tolerating intake at present.  Anticipate discharge with oral formulation antibiotic pending final antibiotic plan / final results of anaerobic cultures.  BC x2 NGTD.    Infectious disease following plan continue IV Unasyn pending anaerobic culture results--hopeful available tomorrow.      Ambulating without complications  for DVT prophylaxis.  Full code.  Discussed with patient and nursing staff.  Anticipate discharge home with family when cleared by consultants.--ID regarding final antibiotic plan      HALEY Du  [facility]  08/21/23  14:20 EDT        Electronically signed by Lexa Jackson APRN at 08/21/23 1420       Richar Jiménez DMD at 08/21/23 0813       Summary:POD#1                 Pt feeling better, pain controlled with meds  AF,VSS, positive UOP, beginning to take PO  Exam: mild drain output, trismus with ASH 25mm, no bleeding  Gram stain shows Gram positive cocci, no growth yet  A/P progressing as expected s/p I and D right submandibular space  Encouraged PO intake and ambulation  Continue abx  If pt improves PO intake and continues to be afebrile could be discharged later today on PO antibiotics    Electronically signed by Richar Jiménez DMD at 08/21/23 0817       Ria Boston MD at 08/20/23 1318          Patient is in the OR.  I am unable to see him today.  I will plan on seeing him tomorrow.  Medical record has been reviewed.  Continue empiric Unasyn but increase the antibiotic frequency to 3 g IV every 6 hours.   We will follow-up cultures.    Electronically signed by Ria Boston MD at 23 1319       Angela Davis MD at 23 0837              Name: Willian Pires ADMIT: 2023   : 2000  PCP: Provider, No Known    MRN: [id number] LOS: 0 days   AGE/SEX: 22 y.o. male  ROOM: [facility]     Subjective   Subjective   Patient seen this morning, complains of significant right facial pain, not well controlled on current regimen, on Dilaudid 0.25 mg every 2 as needed for pain, reports it only takes pain down from 9-7, has not slept well due to pain.  Denies fevers or chills.  On IV Unasyn    Review of Systems    As above  Objective   Objective   Vital Signs  Temp:  [96.9 øF (36.1 øC)-98.3 øF (36.8 øC)] 96.9 øF (36.1 øC)  Heart Rate:  [] 76  Resp:  [16-18] 16  BP: (121-137)/(67-81) 126/73  SpO2:  [95 %-97 %] 95 %  on   ;      Body mass index is 23.06 kg/mý.  Physical Exam    General: Alert and oriented x3, no acute distress  HEENT: Normocephalic, atraumatic  CV: Regular rate and rhythm, no murmurs rubs or gallops  Lungs: Clear to auscultation bilaterally, no crackles or wheezes  Abdomen: Soft, nontender, nondistended  Extremities: No significant peripheral edema , no cyanosis     Results Review     I reviewed the patient's new clinical results.  Results from last 7 days   Lab Units 23  0643 23   WBC 10*3/mm3 10.88* 9.22   HEMOGLOBIN g/dL 14.3 14.5   PLATELETS 10*3/mm3 214 224     Results from last 7 days   Lab Units 23  0643 23   SODIUM mmol/L 139 141   POTASSIUM mmol/L 4.3 3.9   CHLORIDE mmol/L 102 101   CO2 mmol/L 26.0 30.0*   BUN mg/dL 9 10   CREATININE mg/dL 0.74* 0.83   GLUCOSE mg/dL 102* 103*   Estimated Creatinine Clearance: 170.8 mL/min (A) (by C-G formula based on SCr of 0.74 mg/dL (L)).  Results from last 7 days   Lab Units 23   ALBUMIN g/dL 4.4   BILIRUBIN mg/dL 0.4   ALK PHOS U/L 79   AST (SGOT) U/L 16   ALT (SGPT) U/L 15     Results from last 7  days   Lab Units 08/20/23  0643 08/19/23 1951   CALCIUM mg/dL 9.2 9.7   ALBUMIN g/dL  --  4.4     Results from last 7 days   Lab Units 08/19/23 1951   LACTATE mmol/L 0.8   No results found for: COVID19  No results found for: HGBA1C, POCGLU        CT Soft Tissue Neck With Contrast  Narrative: CT NECK SOFT TISSUE WITH CONTRAST     HISTORY: Postoperative swelling under the right jaw     COMPARISON: None available.     TECHNIQUE: Axial CT imaging was obtained through the neck. IV contrast  was administered. Coronal and sagittal reformatted images were obtained.     FINDINGS:  Visualized portions of the brain parenchyma appear unremarkable. There  is no evidence of venous occlusion. Orbits appear unremarkable. Mucous  retention cyst is noted within the right ethmoid sinus. Mastoid air  cells are clear. Patient does appear to be status post bilateral wisdom  tooth extraction. There is some asymmetric soft tissue stranding seen  overlying the right mandible. No discrete drainable fluid collection is  seen. The nasopharynx is normal. Right parapharyngeal soft tissues may  be slightly thickened when compared to the contralateral side. There is  some asymmetric enlargement of the right submandibular gland. Right  mylohyoid muscle also appears thickened and edematous when compared to  the contralateral side. Epiglottis appears normal. Vocal cords also  appear normal. There is some subtle asymmetric enlargement of the  inferior right sternocleidomastoid, with some surrounding edema. Thyroid  gland and trachea are unremarkable. Images through the lung bases are  clear. Prominent right cervical lymph nodes are likely reactive. There  is no prevertebral edema.        Impression: Patient is status post bilateral wisdom tooth extraction. There is  asymmetric inflammatory stranding seen overlying the right side of the  mandible, as well as asymmetric edema and enlargement of the floor of  the mouth. Right submandibular gland also  appears enlarged when compared  to the contralateral side. No discrete drainable abscess is seen. There  is some fullness within the right parapharyngeal space, but the airway  appears intact.     Radiation dose reduction techniques were utilized, including automated  exposure control and exposure modulation based on body size.        This report was finalized on 8/19/2023 9:27 PM by Dr. Saniya Carrillo M.D.       Scheduled Medications  ampicillin-sulbactam, 3 g, Intravenous, Q12H  pantoprazole, 40 mg, Intravenous, Q AM  senna-docusate sodium, 2 tablet, Oral, BID  sodium chloride, 10 mL, Intravenous, Q12H    Infusions  sodium chloride, 75 mL/hr, Last Rate: 75 mL/hr (08/20/23 0748)    Diet  NPO Diet NPO Type: Strict NPO    I have personally reviewed     [x]  Laboratory   [x]  Microbiology   [x]  Radiology   []  EKG/Telemetry  []  Cardiology/Vascular   []  Pathology    []  Records      Assessment/Plan     Active Hospital Problems    Diagnosis  POA    **Right facial swelling [R22.0]  Yes    Oral pain [K13.79]  Yes    S/P wisdom tooth extraction [Z98.818]  Not Applicable      Resolved Hospital Problems   No resolved problems to display.       22 y.o. male admitted with Right facial swelling.    Right submandibular cellulitis//S/P wisdom tooth extraction  Initiated on IV fluids while n.p.o.,  Initiated on IV pantoprazole for GI prophylaxis while n.p.o., and due to recent NSAID use for pain,  Increase IV Dilaudid to 0.5 mg every 2 as needed for pain, one-time IV 30 mg Toradol ordered  Continue IV Unasyn  ID consulted  Oromaxillary surgery following, plan for or for I&D of right submandibular space, and extraction of the tooth 31        SCDs for DVT prophylaxis.  Full code.  Discussed with patient.        Copied text in this note has been reviewed and is accurate as of 08/20/23.         Dictated utilizing Dragon dictation        Angela Davis MD  [facility]  08/20/23  08:41  EDT        Electronically signed by Angela Davis MD at 08/20/23 0846          Consult Notes (last 72 hours)        Ria Boston MD at 08/21/23 0800        Consult Orders    1. Inpatient Infectious Diseases Consult [[id number]] ordered by Richar Jiménez DMD at 08/20/23 0748                 Referring Provider: Christian Wang MD  [address] [place] [address]  Reason for Consultation: Concern for submandibular infection    Subjective   History of present illness: This is a 22-year-old male with no significant past medical history status post recent wisdom tooth extraction who was admitted on August 19 with right facial swelling.  The patient underwent for wisdom teeth removal on August 15.  He was doing well until the day prior to admission when he noticed increasing right-sided pain and swelling.  He was given penicillin and Flagyl per his oral surgeon.  Spite this he continued to have increasing swelling and pain and presented to the emergency room.  [place] labs revealed a normal white blood cell count.  CT showed asymmetric inflammatory stranding on the right side of the mandible as well as asymmetric edema and enlargement of the floor of the mouth.  Enlargement of the right submandibular gland but no discrete drainable fluid collection.  The patient underwent I&D of right submandibular space with dental extraction yesterday.  He tolerated the procedure well.  Currently states the pain is well controlled.  Tolerating antibiotics without abdominal pain vomiting diarrhea or rash      Past Surgical History:   Procedure Laterality Date    TEETH EXTRACTION N/A 8/20/2023    Procedure: TOOTH EXTRACTION;  Surgeon: Richar Jiménez DMD;  Location: [facility];  Service: Maxillofacial;  Laterality: N/A;        reports that he has never smoked. He has never been exposed to tobacco smoke. He has never used smokeless tobacco. He reports current alcohol use of about 2.0 standard drinks per week.  Drug use questions deferred to the physician.    family history is not on file.    No Known Allergies    Medication:  Antibiotics:  Unasyn 3 g IV every 12 hours  Please refer to the medical record for a full medication list    Review of Systems  Pertinent items are noted in HPI, all other systems reviewed and negative    Objective   Vital Signs   Temp:  [96.5 øF (35.8 øC)-98.5 øF (36.9 øC)] 97.6 øF (36.4 øC)  Heart Rate:  [] 95  Resp:  [12-20] 16  BP: (102-144)/() 130/67    Physical Exam:   General: In no acute distress  HEENT: Normocephalic, atraumatic, no scleral icterus.  Submandibular space with drain in place positive swelling no erythema  Neck: Supple, trachea is midline  Cardiovascular: Normal rate, regular rhythm, normal S1 and S2, no murmurs, rubs, or gallops   Respiratory: Lungs are clear to auscultation bilaterally, no wheezing  GI: Abdomen is soft, nontender, nondistended, positive bowel sounds bilaterally  Musculoskeletal: no edema, tenderness or deformity  Skin: No rashes or lesions  Extremities: No E/C/C  Neurological: Alert and oriented, moving all 4 extremities  Psychiatric: Normal mood and affect     Results Review:   I reviewed the patient's new clinical results.  I reviewed the patient's new imaging results and agree with the interpretation.    Lab Results   Component Value Date    WBC 6.57 08/21/2023    HGB 13.3 08/21/2023    HCT 37.9 08/21/2023    MCV 82.6 08/21/2023     08/21/2023       Lab Results   Component Value Date    GLUCOSE 92 08/21/2023    BUN 9 08/21/2023    CREATININE 0.74 (L) 08/21/2023    BCR 12.2 08/21/2023    CO2 23.6 08/21/2023    CALCIUM 9.0 08/21/2023    ALBUMIN 4.4 08/19/2023    LABIL2 1.6 07/29/2018    AST 16 08/19/2023    ALT 15 08/19/2023       Microbiology:  8/20 OR cx (gram stain GPC) cx P  8/19 BCx NGTD x 2    Radiology:  CT of the soft tissues of the neck shows bilateral wisdom tooth extraction.  Asymmetric inflammatory stranding on the right  involving the mandible as well as asymmetric edema and enlargement of the floor of the mouth.  Enlarged right submandibular gland.  No abscess.  Some fullness within the right parapharyngeal space.    Assessment & Plan   Right facial swelling in the setting of recent wisdom tooth extraction status post incision and drainage with dental extraction on     Continue Unasyn  3 g IV every 6 hours.  Operative cultures with growth food to yield to identify.  We will follow-up cultures tomorrow and hopefully provide final antibiotic recommendations.  Anticipate discharge on oral antibiotics    I discussed the patient's findings and my recommendations with patient and nursing staff           Electronically signed by Ria Boston MD at 23 1418       Richar Jiménez DMD at 23 0758        Consult Orders    1. Oral & Maxillofacial (on-call MD unless specified) [[id number]] ordered by Christian Mosley PA                 [facility]   Consult Note    Patient Name: Willian Pires  : 2000  MRN: [id number]  Primary Care Physician:  Provider, No Known  Referring Physician: Christian Wang MD  Date of admission: 2023    Oral & Maxillofacial (on-call MD unless specified)  Consult performed by: Richar Jiménez DMD  Consult ordered by: Christian Mosley PA      Subjective   Subjective     Reason for Consult/ Chief Complaint: right facial swelling    History of Present Illness  Willian Pires is a 22 y.o. male who had extractions  performed on 8/15/23.  Yesterday he began developing progressive swelling of the right jaw despite being on oral penicillin    Review of Systems dysphagia, jaw pain, trismus    Personal History     History reviewed. No pertinent past medical history.    History reviewed. No pertinent surgical history.    Family History: family history is not on file. Otherwise pertinent FHx was reviewed and not pertinent to current issue.    Social History:  reports that he has never smoked. He  has never been exposed to tobacco smoke. He has never used smokeless tobacco. He reports current alcohol use of about 2.0 standard drinks per week. Drug use questions deferred to the physician. Positive Marijuana use    Home Medications: penicillin       Allergies:  No Known Allergies    Objective    Objective     Vitals:  Temp:  [96.9 øF (36.1 øC)-98.3 øF (36.8 øC)] 96.9 øF (36.1 øC)  Heart Rate:  [] 76  Resp:  [16-18] 16  BP: (121-137)/(67-81) 126/73    Physical Exam 1/4 right firm submandibular swelling, ASH 25mm, no palatal draping, recent extraction sites of 3rd molars and tooth 32, no swelling or pus intraorally.  [place] tooth #31    Result Review    Result Review:  I have personally reviewed the results from the time of this admission to 8/20/2023 07:58 EDT and agree with these findings:  [x]  Laboratory list / accordion  []  Microbiology  [x]  Radiology  []  EKG/Telemetry   []  Cardiology/Vascular   []  Pathology  []  Old records  []  Other:  Most notable findings include: right submandibular swelling      Assessment & Plan   Assessment / Plan     Brief Patient Summary:  Willian Pires is a 22 y.o. male who has right submandibular cellulitis which appears to be secondary to tooth #31    Active Hospital Problems:  Active Hospital Problems    Diagnosis     **Right facial swelling     Oral pain     S/P wisdom tooth extraction      Plan:   To OR for I and D of right submandibular space. Extraction of tooth 31    Richar Jiménez DMD      Electronically signed by Richar Jiménez DMD at 08/20/23 8494

## 2023-08-22 NOTE — PROGRESS NOTES
LOS: 0 days     Chief Complaint: Submandibular infection    Interval History: Afebrile, decreased swelling.  Patient states he feels well.  Tolerating antibiotics without abdominal pain nausea vomiting or diarrhea.  No rash    Vital Signs  Temp:  [96.9 øF (36.1 øC)-97.3 øF (36.3 øC)] 97 øF (36.1 øC)  Heart Rate:  [55-67] 55  Resp:  [16-18] 16  BP: (112-124)/(60-73) 112/64    Physical Exam:  General: In no acute distress  HEENT: Right facial swelling has decreased  Cardiovascular: RRR, no lower extremity edema  Respiratory: Breathing comfortably on room air  GI: Soft, NT/ND, + bowel sounds bilaterally  Skin: No rashes     Antibiotics:  Unasyn 3 g IV every 6 hours     Results Review:    Lab Results   Component Value Date    WBC 6.57 08/21/2023    HGB 13.3 08/21/2023    HCT 37.9 08/21/2023    MCV 82.6 08/21/2023     08/21/2023     Lab Results   Component Value Date    GLUCOSE 92 08/21/2023    BUN 9 08/21/2023    CREATININE 0.74 (L) 08/21/2023    BCR 12.2 08/21/2023    CO2 23.6 08/21/2023    CALCIUM 9.0 08/21/2023    ALBUMIN 4.4 08/19/2023    LABIL2 1.6 07/29/2018    AST 16 08/19/2023    ALT 15 08/19/2023       Microbiology:  8/20 OR cx and positive cocci.  8/19 BCx NGTD x 2    Assessment & Plan   Right facial swelling in the setting of recent wisdom tooth extraction status post incision and drainage with dental extraction on August 20     Discussed with microbiology cultures with most likely 2 different species of Streptococcus.  I will follow-up identification and sensitivities but in the meantime can discharge the patient on penicillin 500 mg p.o. every 6 hours and Flagyl 500 mg p.o. 3 times daily x7 days.  Patient states these are the antibiotics he already has at home.  Follow-up with oral surgery per their recommendations.

## 2023-08-22 NOTE — CASE MANAGEMENT/SOCIAL WORK
Discharge Planning Assessment  [facility]     Patient Name: Willian Pires  MRN: [id number]  Today's Date: 8/22/2023    Admit Date: 8/19/2023    Plan: home no needs   Discharge Needs Assessment       Row Name 08/22/23 1305       Living Environment    People in Home significant other    Current Living Arrangements home    Potentially Unsafe Housing Conditions none    Primary Care Provided by self    Provides Primary Care For no one    Family Caregiver if Needed significant other    Quality of Family Relationships helpful;involved    Able to Return to Prior Arrangements yes       Resource/Environmental Concerns    Resource/Environmental Concerns none       Transition Planning    Patient/Family Anticipates Transition to home    Patient/Family Anticipated Services at Transition none    Transportation Anticipated family or friend will provide       Discharge Needs Assessment    Readmission Within the Last 30 Days no previous admission in last 30 days    Equipment Currently Used at Home none    Provided Post Acute Provider List? N/A                   Discharge Plan       Row Name 08/22/23 1306       Plan    Plan home no needs    Patient/Family in Agreement with Plan yes    Plan Comments Spoke with pt bedside. Confirmed facesheet correct. Explained role of CCP. Pt reports he lives with his significant other. He is IADLs no DME used. No HH or SNF history. No PCP, list provided. Pt plans home, denies needs. CCP to follow.                  Continued Care and Services - Admitted Since 8/19/2023    Coordination has not been started for this encounter.       Expected Discharge Date and Time       Expected Discharge Date Expected Discharge Time    Aug 22, 2023            Demographic Summary    No documentation.                  Functional Status    No documentation.                  Psychosocial    No documentation.                  Abuse/Neglect    No documentation.                  Legal    No documentation.                   Substance Abuse    No documentation.                  Patient Forms    No documentation.                     RADHA Linn

## 2023-08-22 NOTE — DISCHARGE SUMMARY
Patient Name: Willian Pires  : 2000  MRN: [id number]    Date of Admission: 2023  Date of Discharge:  2023  Primary Care Physician: Provider, No Known      Chief Complaint:   Oral Swelling      Discharge Diagnoses     Active Hospital Problems    Diagnosis  POA    **Right facial swelling [R22.0]  Yes    Oral pain [K13.79]  Yes    S/P wisdom tooth extraction [Z98.818]  Not Applicable      Resolved Hospital Problems   No resolved problems to display.        Hospital Course     Mr. Pires is a 22 y.o. male with no significant medical history who presented to [facility] initially complaining of oral swelling.  Please see the admitting history and physical for further details.  He was found to have right facial swelling and oral pain following wisdom tooth extraction and was admitted to the hospital for further evaluation and treatment.      [place] teeth removed on Tuesday--8/15/2023.  Patient noted increased facial swelling on 2023 and progressively worse; therefore, to [facility] ER for further evaluation.    Oral surgery performed I&D of right submandibular space and extraction of tooth on 2023 for right submandibular space infection.  Penrose drain in place per oral surgery with outpatient follow-up for drain management.  Patient tolerating mechanical soft diet.  Infectious disease recommend transition IV Unasyn to penicillin 500 mg p.o. every 6 hours and Flagyl 5 mg p.o. 3 times daily x7 days.    Patient appears medically stable for discharge home on 2023 agrees with plan for follow-up as previously discussed.    Day of Discharge       Physical Exam:  Temp:  [96.9 øF (36.1 øC)-97.3 øF (36.3 øC)] 97 øF (36.1 øC)  Heart Rate:  [55-67] 55  Resp:  [16-18] 16  BP: (112-124)/(60-73) 112/64  Body mass index is 23.38 kg/mý.    Physical Exam  Constitutional:       General: He is not in acute distress.     Appearance: He is not toxic-appearing.   HENT:      Head: Normocephalic  and atraumatic.   Eyes:      Extraocular Movements: Extraocular movements intact.      Conjunctiva/sclera: Conjunctivae normal.   Neck:      Comments: Penrose drain with dressing in place  Cardiovascular:      Rate and Rhythm: Normal rate.      Heart sounds: Normal heart sounds.   Pulmonary:      Effort: Pulmonary effort is normal.      Breath sounds: Normal breath sounds.   Abdominal:      General: Bowel sounds are normal.      Palpations: Abdomen is soft.   Musculoskeletal:      Cervical back: Normal range of motion.      Right lower leg: No edema.      Left lower leg: No edema.   Skin:     General: Skin is warm and dry.   Neurological:      Mental Status: He is alert and oriented to person, place, and time.      Cranial Nerves: No cranial nerve deficit.   Psychiatric:         Behavior: Behavior normal.         Thought Content: Thought content normal.       Consultants     Consult Orders (all) (From admission, onward)       Start     Ordered    08/20/23 0748  Inpatient Infectious Diseases Consult  Once        Specialty:  Infectious Diseases  Provider:  Ria Boston MD    08/20/23 0748    08/19/23 2206  [facility] (on-call MD unless specified) Details  Once        Specialty:  Hospitalist  Provider:  (Not yet assigned)    08/19/23 2205 08/19/23 2138  Oral & Maxillofacial (on-call MD unless specified)  Once        Specialty:  Oral Surgery  Provider:  Richar Jiménez DMD    08/19/23 2137                  Procedures     TOOTH EXTRACTION    Imaging Results (All)       Procedure Component Value Units Date/Time    CT Soft Tissue Neck With Contrast [[id number]] Collected: 08/19/23 2112     Updated: 08/19/23 2130    Narrative:      CT NECK SOFT TISSUE WITH CONTRAST     HISTORY: Postoperative swelling under the right jaw     COMPARISON: None available.     TECHNIQUE: Axial CT imaging was obtained through the neck. IV contrast  was administered. Coronal and sagittal reformatted images were obtained.     FINDINGS:  Visualized  portions of the brain parenchyma appear unremarkable. There  is no evidence of venous occlusion. Orbits appear unremarkable. Mucous  retention cyst is noted within the right ethmoid sinus. Mastoid air  cells are clear. Patient does appear to be status post bilateral wisdom  tooth extraction. There is some asymmetric soft tissue stranding seen  overlying the right mandible. No discrete drainable fluid collection is  seen. The nasopharynx is normal. Right parapharyngeal soft tissues may  be slightly thickened when compared to the contralateral side. There is  some asymmetric enlargement of the right submandibular gland. Right  mylohyoid muscle also appears thickened and edematous when compared to  the contralateral side. Epiglottis appears normal. Vocal cords also  appear normal. There is some subtle asymmetric enlargement of the  inferior right sternocleidomastoid, with some surrounding edema. Thyroid  gland and trachea are unremarkable. Images through the lung bases are  clear. Prominent right cervical lymph nodes are likely reactive. There  is no prevertebral edema.          Impression:      Patient is status post bilateral wisdom tooth extraction. There is  asymmetric inflammatory stranding seen overlying the right side of the  mandible, as well as asymmetric edema and enlargement of the floor of  the mouth. Right submandibular gland also appears enlarged when compared  to the contralateral side. No discrete drainable abscess is seen. There  is some fullness within the right parapharyngeal space, but the airway  appears intact.     Radiation dose reduction techniques were utilized, including automated  exposure control and exposure modulation based on body size.        This report was finalized on 8/19/2023 9:27 PM by Dr. Saniya Carrillo M.D.                 Pertinent Labs     Results from last 7 days   Lab Units 08/21/23  0623 08/20/23  0643 08/19/23 1951   WBC 10*3/mm3 6.57 10.88* 9.22   HEMOGLOBIN g/dL 13.3  14.3 14.5   PLATELETS 10*3/mm3 212 214 224     Results from last 7 days   Lab Units 08/21/23  0622 08/20/23 0643 08/19/23 1951   SODIUM mmol/L 136 139 141   POTASSIUM mmol/L 4.1 4.3 3.9   CHLORIDE mmol/L 103 102 101   CO2 mmol/L 23.6 26.0 30.0*   BUN mg/dL 9 9 10   CREATININE mg/dL 0.74* 0.74* 0.83   GLUCOSE mg/dL 92 102* 103*   EGFR mL/min/1.73 131.4 131.4 126.9     Results from last 7 days   Lab Units 08/19/23 1951   ALBUMIN g/dL 4.4   BILIRUBIN mg/dL 0.4   ALK PHOS U/L 79   AST (SGOT) U/L 16   ALT (SGPT) U/L 15     Results from last 7 days   Lab Units 08/21/23 0622 08/20/23 0643 08/19/23 1951   CALCIUM mg/dL 9.0 9.2 9.7   ALBUMIN g/dL  --   --  4.4               Invalid input(s): LDLCALC  Results from last 7 days   Lab Units 08/20/23  1304 08/19/23  2004 08/19/23 1951   BLOODCX   --  No growth at 2 days No growth at 2 days   WOUNDCX  Scant growth (1+) Gram Positive Cocci*  --   --          Test Results Pending at Discharge     Pending Labs       Order Current Status    Anaerobic Culture - Surgical Site, Oral Cavity In process    Blood Culture - Blood, Arm, Left Preliminary result    Blood Culture - Blood, Arm, Right Preliminary result    Wound Culture - Surgical Site, Oral Cavity Preliminary result            Discharge Details        Discharge Medications        New Medications        Instructions Start Date   metroNIDAZOLE 500 MG tablet  Commonly known as: Flagyl   500 mg, Oral, 3 Times Daily      penicillin v potassium 500 MG tablet  Commonly known as: VEETID   500 mg, Oral, 4 Times Daily               No Known Allergies    Discharge Disposition:  Home or Self Care      Discharge Diet:  Diet Order   Procedures    Diet: Regular/House Diet; Texture: Mechanical Ground (NDD 2); Fluid Consistency: Thin (IDDSI 0)       Discharge Activity:   Activity Instructions       Activity as Tolerated              CODE STATUS:    Code Status and Medical Interventions:   Ordered at: 08/19/23 2223     Code Status (Patient  has no pulse and is not breathing):    CPR (Attempt to Resuscitate)     Medical Interventions (Patient has pulse or is breathing):    Full Support       No future appointments.   Follow-up Information       Richar Jiménez DMD. Call on 8/24/2023.    Specialty: Oral Surgery  Why: follow up 8/24 for drain removal  call [phone] with questions  Contact information:  [address]  [address]  [facility] [address] [phone]               Provider, No Known .    Contact information:  [facility] [address] [phone]                             Time Spent on Discharge:  Greater than 30 minutes      HALEY Du  [facility]  08/22/23  13:23 EDT

## 2023-08-22 NOTE — PROGRESS NOTES
Case Management Discharge Note      Final Note: Discharged to home on 8/22/23. NASIM Jolley RN, CCP    Provided Post Acute Provider List?: N/A    Selected Continued Care - Discharged on 8/22/2023 Admission date: 8/19/2023 - Discharge disposition: Home or Self Care      Destination    No services have been selected for the patient.                Durable Medical Equipment    No services have been selected for the patient.                Dialysis/Infusion    No services have been selected for the patient.                Home Medical Care    No services have been selected for the patient.                Therapy    No services have been selected for the patient.                Community Resources    No services have been selected for the patient.                Community & DME    No services have been selected for the patient.                    Transportation Services  Private: Car    Final Discharge Disposition Code: 01 - home or self-care

## 2023-08-22 NOTE — PLAN OF CARE
Goal Outcome Evaluation:              Outcome Evaluation: Pt a&ox4. v/s stable. Pt requested lesser strength pain medication. Gave pt iv toradol. Awaiting cultures for correct abx and discharge.

## 2023-08-23 LAB
BACTERIA SPEC AEROBE CULT: ABNORMAL
GRAM STN SPEC: ABNORMAL
GRAM STN SPEC: ABNORMAL

## 2023-08-23 NOTE — PAYOR COMM NOTE
"Willian Pires (22 y.o. Male)          DC SUMMARY FOR [id number]      CONTACT MARIVEL OLYA  P# [phone]  F# [phone]         Date of Birth   2000    Social Security Number       Address   [address] [place] [address]    Home Phone   [phone]    MRN   [id number]       Religious   None    Marital Status   Single                            Admission Date   23    Admission Type   Emergency    Admitting Provider   Christian Wang MD    Attending Provider       Department, Room/Bed   [address], P482/1       Discharge Date   2023    Discharge Disposition   Home or Self Care    Discharge Destination                                 Attending Provider: (none)   Allergies: No Known Allergies    Isolation: None   Infection: None   Code Status: Prior    Ht: 182.9 cm (72\")   Wt: 78.2 kg (172 lb 6.4 oz)    Admission Cmt: None   Principal Problem: Right facial swelling [R22.0]                   Active Insurance as of 2023       Primary Coverage       Payor Plan Insurance Group Employer/Plan Group    [facility] [id number]       Payor Plan Address Payor Plan Phone Number Payor Plan Fax Number Effective Dates    [address] [address] [phone]  2021 - None Entered    [facility] [id number]         Subscriber Name Subscriber Birth Date Member ID       WILLIAN PIRES 2000 [id number]                     Emergency Contacts        (Rel.) Home Phone Work Phone Mobile Phone    RANI WILEY (Significant Other) -- -- [phone]                 Discharge Summary        Lexa Jackson APRN at 23 1323              Patient Name: Willian Pires  : 2000  MRN: [id number]    Date of Admission: 2023  Date of Discharge:  2023  Primary Care Physician: Provider, No Known      Chief Complaint:   Oral Swelling      Discharge Diagnoses     Active Hospital Problems    Diagnosis  POA    **Right facial swelling [R22.0]  Yes    Oral pain [K13.79]  Yes "    S/P wisdom tooth extraction [Z98.818]  Not Applicable      Resolved Hospital Problems   No resolved problems to display.        Hospital Course     Mr. Pires is a 22 y.o. male with no significant medical history who presented to [facility] initially complaining of oral swelling.  Please see the admitting history and physical for further details.  He was found to have right facial swelling and oral pain following wisdom tooth extraction and was admitted to the hospital for further evaluation and treatment.      [place] teeth removed on Tuesday--8/15/2023.  Patient noted increased facial swelling on 8/18/2023 and progressively worse; therefore, to [facility] ER for further evaluation.    Oral surgery performed I&D of right submandibular space and extraction of tooth on 8/20/2023 for right submandibular space infection.  Penrose drain in place per oral surgery with outpatient follow-up for drain management.  Patient tolerating mechanical soft diet.  Infectious disease recommend transition IV Unasyn to penicillin 500 mg p.o. every 6 hours and Flagyl 5 mg p.o. 3 times daily x7 days.    Patient appears medically stable for discharge home on 8/22/2023 agrees with plan for follow-up as previously discussed.    Day of Discharge       Physical Exam:  Temp:  [96.9 øF (36.1 øC)-97.3 øF (36.3 øC)] 97 øF (36.1 øC)  Heart Rate:  [55-67] 55  Resp:  [16-18] 16  BP: (112-124)/(60-73) 112/64  Body mass index is 23.38 kg/mý.    Physical Exam  Constitutional:       General: He is not in acute distress.     Appearance: He is not toxic-appearing.   HENT:      Head: Normocephalic and atraumatic.   Eyes:      Extraocular Movements: Extraocular movements intact.      Conjunctiva/sclera: Conjunctivae normal.   Neck:      Comments: Penrose drain with dressing in place  Cardiovascular:      Rate and Rhythm: Normal rate.      Heart sounds: Normal heart sounds.   Pulmonary:      Effort: Pulmonary effort is normal.      Breath  sounds: Normal breath sounds.   Abdominal:      General: Bowel sounds are normal.      Palpations: Abdomen is soft.   Musculoskeletal:      Cervical back: Normal range of motion.      Right lower leg: No edema.      Left lower leg: No edema.   Skin:     General: Skin is warm and dry.   Neurological:      Mental Status: He is alert and oriented to person, place, and time.      Cranial Nerves: No cranial nerve deficit.   Psychiatric:         Behavior: Behavior normal.         Thought Content: Thought content normal.       Consultants     Consult Orders (all) (From admission, onward)       Start     Ordered    08/20/23 0748  Inpatient Infectious Diseases Consult  Once        Specialty:  Infectious Diseases  Provider:  Ria Boston MD    08/20/23 0748    08/19/23 2206  [facility] (on-call MD unless specified) Details  Once        Specialty:  Hospitalist  Provider:  (Not yet assigned)    08/19/23 2205 08/19/23 2138  Oral & Maxillofacial (on-call MD unless specified)  Once        Specialty:  Oral Surgery  Provider:  Richar Jiménez DMD    08/19/23 2137                  Procedures     TOOTH EXTRACTION    Imaging Results (All)       Procedure Component Value Units Date/Time    CT Soft Tissue Neck With Contrast [[id number]] Collected: 08/19/23 2112     Updated: 08/19/23 2130    Narrative:      CT NECK SOFT TISSUE WITH CONTRAST     HISTORY: Postoperative swelling under the right jaw     COMPARISON: None available.     TECHNIQUE: Axial CT imaging was obtained through the neck. IV contrast  was administered. Coronal and sagittal reformatted images were obtained.     FINDINGS:  Visualized portions of the brain parenchyma appear unremarkable. There  is no evidence of venous occlusion. Orbits appear unremarkable. Mucous  retention cyst is noted within the right ethmoid sinus. Mastoid air  cells are clear. Patient does appear to be status post bilateral wisdom  tooth extraction. There is some asymmetric soft tissue stranding  seen  overlying the right mandible. No discrete drainable fluid collection is  seen. The nasopharynx is normal. Right parapharyngeal soft tissues may  be slightly thickened when compared to the contralateral side. There is  some asymmetric enlargement of the right submandibular gland. Right  mylohyoid muscle also appears thickened and edematous when compared to  the contralateral side. Epiglottis appears normal. Vocal cords also  appear normal. There is some subtle asymmetric enlargement of the  inferior right sternocleidomastoid, with some surrounding edema. Thyroid  gland and trachea are unremarkable. Images through the lung bases are  clear. Prominent right cervical lymph nodes are likely reactive. There  is no prevertebral edema.          Impression:      Patient is status post bilateral wisdom tooth extraction. There is  asymmetric inflammatory stranding seen overlying the right side of the  mandible, as well as asymmetric edema and enlargement of the floor of  the mouth. Right submandibular gland also appears enlarged when compared  to the contralateral side. No discrete drainable abscess is seen. There  is some fullness within the right parapharyngeal space, but the airway  appears intact.     Radiation dose reduction techniques were utilized, including automated  exposure control and exposure modulation based on body size.        This report was finalized on 8/19/2023 9:27 PM by Dr. Saniya Carrillo M.D.                 Pertinent Labs     Results from last 7 days   Lab Units 08/21/23  0623 08/20/23  0643 08/19/23 1951   WBC 10*3/mm3 6.57 10.88* 9.22   HEMOGLOBIN g/dL 13.3 14.3 14.5   PLATELETS 10*3/mm3 212 214 224     Results from last 7 days   Lab Units 08/21/23  0622 08/20/23  0643 08/19/23 1951   SODIUM mmol/L 136 139 141   POTASSIUM mmol/L 4.1 4.3 3.9   CHLORIDE mmol/L 103 102 101   CO2 mmol/L 23.6 26.0 30.0*   BUN mg/dL 9 9 10   CREATININE mg/dL 0.74* 0.74* 0.83   GLUCOSE mg/dL 92 102* 103*   EGFR  mL/min/1.73 131.4 131.4 126.9     Results from last 7 days   Lab Units 08/19/23 1951   ALBUMIN g/dL 4.4   BILIRUBIN mg/dL 0.4   ALK PHOS U/L 79   AST (SGOT) U/L 16   ALT (SGPT) U/L 15     Results from last 7 days   Lab Units 08/21/23  0622 08/20/23  0643 08/19/23 1951   CALCIUM mg/dL 9.0 9.2 9.7   ALBUMIN g/dL  --   --  4.4               Invalid input(s): LDLCALC  Results from last 7 days   Lab Units 08/20/23  1304 08/19/23 2004 08/19/23 1951   BLOODCX   --  No growth at 2 days No growth at 2 days   WOUNDCX  Scant growth (1+) Gram Positive Cocci*  --   --          Test Results Pending at Discharge     Pending Labs       Order Current Status    Anaerobic Culture - Surgical Site, Oral Cavity In process    Blood Culture - Blood, Arm, Left Preliminary result    Blood Culture - Blood, Arm, Right Preliminary result    Wound Culture - Surgical Site, Oral Cavity Preliminary result            Discharge Details        Discharge Medications        New Medications        Instructions Start Date   metroNIDAZOLE 500 MG tablet  Commonly known as: Flagyl   500 mg, Oral, 3 Times Daily      penicillin v potassium 500 MG tablet  Commonly known as: VEETID   500 mg, Oral, 4 Times Daily               No Known Allergies    Discharge Disposition:  Home or Self Care      Discharge Diet:  Diet Order   Procedures    Diet: Regular/House Diet; Texture: Mechanical Ground (NDD 2); Fluid Consistency: Thin (IDDSI 0)       Discharge Activity:   Activity Instructions       Activity as Tolerated              CODE STATUS:    Code Status and Medical Interventions:   Ordered at: 08/19/23 2223     Code Status (Patient has no pulse and is not breathing):    CPR (Attempt to Resuscitate)     Medical Interventions (Patient has pulse or is breathing):    Full Support       No future appointments.   Follow-up Information       Richar Jiménez DMD. Call on 8/24/2023.    Specialty: Oral Surgery  Why: follow up 8/24 for drain removal  call [phone] with  questions  Contact information:  [address]  [address]  [place] [id number] [phone]               Provider, No Known .    Contact information:  [place] [id number] [phone]                             Time Spent on Discharge:  Greater than 30 minutes      HALEY Du  [facility]  08/22/23  13:23 EDT                Electronically signed by Lexa Jackson APRN at 08/22/23 1400

## 2023-08-24 LAB
BACTERIA SPEC AEROBE CULT: NORMAL
BACTERIA SPEC AEROBE CULT: NORMAL

## 2023-08-26 LAB — BACTERIA SPEC ANAEROBE CULT: ABNORMAL

## (undated) DEVICE — STANDARD HYPODERMIC NEEDLE,POLYPROPYLENE HUB: Brand: MONOJECT

## (undated) DEVICE — SPONGE,LAP,4"X18",XR,ST,5/PK,40PK/CS: Brand: MEDLINE INDUSTRIES, INC.

## (undated) DEVICE — PK ENT MINOR 40

## (undated) DEVICE — GLV SURG PREMIERPRO ORTHO LTX PF SZ8 BRN

## (undated) DEVICE — BLD TONG INDIV/WRP A/ 6IN STRL

## (undated) DEVICE — SUT GUT CHRM 3/0 PS2 27IN 1638H

## (undated) DEVICE — GAUZE,SPONGE,4"X4",16PLY,XRAY,STRL,LF: Brand: MEDLINE

## (undated) DEVICE — IRRIGATOR BULB ASEPTO 60CC STRL